# Patient Record
Sex: MALE | Race: WHITE | URBAN - METROPOLITAN AREA
[De-identification: names, ages, dates, MRNs, and addresses within clinical notes are randomized per-mention and may not be internally consistent; named-entity substitution may affect disease eponyms.]

---

## 2017-01-16 ENCOUNTER — OUTPATIENT (OUTPATIENT)
Dept: OUTPATIENT SERVICES | Facility: HOSPITAL | Age: 57
LOS: 1 days | Discharge: HOME | End: 2017-01-16

## 2017-06-27 DIAGNOSIS — K40.90 UNILATERAL INGUINAL HERNIA, WITHOUT OBSTRUCTION OR GANGRENE, NOT SPECIFIED AS RECURRENT: ICD-10-CM

## 2017-06-27 DIAGNOSIS — M32.9 SYSTEMIC LUPUS ERYTHEMATOSUS, UNSPECIFIED: ICD-10-CM

## 2017-06-27 DIAGNOSIS — J44.9 CHRONIC OBSTRUCTIVE PULMONARY DISEASE, UNSPECIFIED: ICD-10-CM

## 2017-06-27 DIAGNOSIS — F43.10 POST-TRAUMATIC STRESS DISORDER, UNSPECIFIED: ICD-10-CM

## 2018-01-25 ENCOUNTER — APPOINTMENT (OUTPATIENT)
Dept: SURGERY | Facility: CLINIC | Age: 58
End: 2018-01-25
Payer: MEDICARE

## 2018-01-25 VITALS — BODY MASS INDEX: 28.23 KG/M2 | HEIGHT: 74 IN | WEIGHT: 220 LBS

## 2018-01-25 DIAGNOSIS — E66.09 OTHER OBESITY DUE TO EXCESS CALORIES: ICD-10-CM

## 2018-01-25 DIAGNOSIS — M54.9 DORSALGIA, UNSPECIFIED: ICD-10-CM

## 2018-01-25 DIAGNOSIS — S76.212A STRAIN OF ADDUCTOR MUSCLE, FASCIA AND TENDON OF LEFT THIGH, INITIAL ENCOUNTER: ICD-10-CM

## 2018-01-25 PROCEDURE — 99214 OFFICE O/P EST MOD 30 MIN: CPT

## 2018-04-18 ENCOUNTER — OUTPATIENT (OUTPATIENT)
Dept: OUTPATIENT SERVICES | Facility: HOSPITAL | Age: 58
LOS: 1 days | Discharge: HOME | End: 2018-04-18

## 2018-04-18 DIAGNOSIS — M54.89 OTHER DORSALGIA: ICD-10-CM

## 2020-03-09 ENCOUNTER — TRANSCRIPTION ENCOUNTER (OUTPATIENT)
Age: 60
End: 2020-03-09

## 2020-03-09 VITALS
OXYGEN SATURATION: 99 % | DIASTOLIC BLOOD PRESSURE: 89 MMHG | HEART RATE: 104 BPM | HEIGHT: 74 IN | RESPIRATION RATE: 18 BRPM | SYSTOLIC BLOOD PRESSURE: 148 MMHG | TEMPERATURE: 98 F | WEIGHT: 259.48 LBS

## 2020-03-09 RX ORDER — POVIDONE-IODINE 5 %
1 AEROSOL (ML) TOPICAL ONCE
Refills: 0 | Status: COMPLETED | OUTPATIENT
Start: 2020-03-10 | End: 2020-03-10

## 2020-03-09 NOTE — H&P ADULT - NSICDXPASTSURGICALHX_GEN_ALL_CORE_FT
PAST SURGICAL HISTORY:  S/P hernia repair inguinal    Surgery, elective spinal fusion, T9-L5    Surgery, elective cervical spine fusion    Surgery, elective sppinal cord stimulator implanted    Surgery, elective ankle surgery PAST SURGICAL HISTORY:  S/P hernia repair inguinal    Surgery, elective spinal fusion, T9-L5    Surgery, elective cervical spine fusion    Surgery, elective sppinal cord stimulator implanted    Surgery, elective ankle surgery, right

## 2020-03-09 NOTE — H&P ADULT - NSHPPHYSICALEXAM_GEN_ALL_CORE
General: Alert and oriented, NAD  MSK:  Decreased ROM of lumbar spine secondary to pain.    Remainder of PE as per medical clearance General: Alert and oriented, NAD  MSK:  Decreased ROM of lumbar spine secondary to pain.  calves soft, nontender bilaterally   sensation intact to light touch bilateral lower extremities  DP 2+,  brisk capillary refill  EHL/FHL/TA/GS 5/5 bilaterally     Remainder of PE as per medical clearance

## 2020-03-09 NOTE — H&P ADULT - PROBLEM SELECTOR PLAN 1
Admit to orthopedics.  Presents for elective anterior and posterior spinal fusion with instrumentation and cage and iliac crest bone graft.   Medically optimized and cleared for surgery by  Admit to orthopedics.  Presents for elective anterior and posterior spinal fusion with instrumentation and cage and iliac crest bone graft.   Medically optimized and cleared for surgery

## 2020-03-09 NOTE — H&P ADULT - NSICDXPASTMEDICALHX_GEN_ALL_CORE_FT
PAST MEDICAL HISTORY:  Chronic lower back pain     Chronic obstructive pulmonary disease COPD (chronic obstructive pulmonary disease)    Depression anxiety, PTSD, post 911    GERD (gastroesophageal reflux disease)     HLD (hyperlipidemia)     HTN (hypertension)     Spinal stenosis     Systemic lupus erythematosus Lupus (systemic lupus erythematosus)

## 2020-03-09 NOTE — H&P ADULT - HISTORY OF PRESENT ILLNESS
60yo male co lumbar spine pain x .   Patient presents for elective anterior and posterior spinal fusion with instrumentation and cage and iliac crest bone graft. 58yo male co lumbar spine pain x since March 2010. Patient denies known injury, Reports neck and back pain have gradually progressed over time. Patient describes pain radiating into his lower extremities. Denies use of a cane or walker. Denies significant numbness/tingling, weakness. He reports failure of conservative management including  Patient presents for elective anterior and posterior spinal fusion with instrumentation and cage and iliac crest bone graft. 58yo male co lumbar spine pain x since March 2010. Patient denies known injury, Reports neck and back pain have gradually progressed over time. Patient describes pain radiating into his lower extremities. Denies use of a cane or walker. Denies significant numbness/tingling, weakness. He reports failure of conservative management including activity modification, oral analgesics, previous procedures. He denies recent illness. Endorses steroid use of lupus. Denies h/o blood clots or use of anticoagulants.   Patient presents for elective anterior and posterior spinal fusion with instrumentation and cage, possible iliac crest bone graft, possible pelvic/SI joint fusion with Dr. Ratliff

## 2020-03-09 NOTE — H&P ADULT - NSHPLABSRESULTS_GEN_ALL_CORE
Preop CBC, BMP, PT/INR, PTT:   UA negative   3M DOS   T + S DOS   Preop CXR within normal limits, reviewed per medical clearance   Preop EKG NSR and reviewed per medical clearance Preop CBC, BMP, PT/INR, PTT  UA negative   3M DOS   T + S DOS   Preop CXR   Preop EKG

## 2020-03-10 ENCOUNTER — INPATIENT (INPATIENT)
Facility: HOSPITAL | Age: 60
LOS: 8 days | Discharge: HOME | DRG: 455 | End: 2020-03-19
Attending: SPECIALIST | Admitting: SPECIALIST
Payer: MEDICARE

## 2020-03-10 DIAGNOSIS — F32.9 MAJOR DEPRESSIVE DISORDER, SINGLE EPISODE, UNSPECIFIED: ICD-10-CM

## 2020-03-10 DIAGNOSIS — I10 ESSENTIAL (PRIMARY) HYPERTENSION: ICD-10-CM

## 2020-03-10 DIAGNOSIS — Z41.9 ENCOUNTER FOR PROCEDURE FOR PURPOSES OTHER THAN REMEDYING HEALTH STATE, UNSPECIFIED: Chronic | ICD-10-CM

## 2020-03-10 DIAGNOSIS — Z98.890 OTHER SPECIFIED POSTPROCEDURAL STATES: Chronic | ICD-10-CM

## 2020-03-10 DIAGNOSIS — J44.9 CHRONIC OBSTRUCTIVE PULMONARY DISEASE, UNSPECIFIED: ICD-10-CM

## 2020-03-10 DIAGNOSIS — E78.5 HYPERLIPIDEMIA, UNSPECIFIED: ICD-10-CM

## 2020-03-10 DIAGNOSIS — M32.9 SYSTEMIC LUPUS ERYTHEMATOSUS, UNSPECIFIED: ICD-10-CM

## 2020-03-10 DIAGNOSIS — K21.9 GASTRO-ESOPHAGEAL REFLUX DISEASE WITHOUT ESOPHAGITIS: ICD-10-CM

## 2020-03-10 DIAGNOSIS — M48.00 SPINAL STENOSIS, SITE UNSPECIFIED: ICD-10-CM

## 2020-03-10 LAB
BASE EXCESS BLDA CALC-SCNC: -3 MMOL/L — LOW (ref -2–3)
BASE EXCESS BLDA CALC-SCNC: 0.1 MMOL/L — SIGNIFICANT CHANGE UP (ref -2–3)
CA-I BLDA-SCNC: 1.12 MMOL/L — SIGNIFICANT CHANGE UP (ref 1.12–1.3)
CA-I BLDA-SCNC: 1.12 MMOL/L — SIGNIFICANT CHANGE UP (ref 1.12–1.3)
COHGB MFR BLDA: 0.2 % — SIGNIFICANT CHANGE UP
COHGB MFR BLDA: 0.5 % — SIGNIFICANT CHANGE UP
GLUCOSE BLDC GLUCOMTR-MCNC: 117 MG/DL — HIGH (ref 70–99)
GLUCOSE BLDC GLUCOMTR-MCNC: 138 MG/DL — HIGH (ref 70–99)
HCO3 BLDA-SCNC: 25 MMOL/L — SIGNIFICANT CHANGE UP (ref 21–28)
HCO3 BLDA-SCNC: 25 MMOL/L — SIGNIFICANT CHANGE UP (ref 21–28)
HCT VFR BLD CALC: 36.2 % — LOW (ref 39–50)
HGB BLD-MCNC: 12 G/DL — LOW (ref 13–17)
HGB BLDA-MCNC: 12.3 G/DL — LOW (ref 13–17)
HGB BLDA-MCNC: 12.6 G/DL — LOW (ref 13–17)
MCHC RBC-ENTMCNC: 32 PG — SIGNIFICANT CHANGE UP (ref 27–34)
MCHC RBC-ENTMCNC: 33.1 GM/DL — SIGNIFICANT CHANGE UP (ref 32–36)
MCV RBC AUTO: 96.5 FL — SIGNIFICANT CHANGE UP (ref 80–100)
METHGB MFR BLDA: 0.2 % — SIGNIFICANT CHANGE UP
METHGB MFR BLDA: 0.5 % — SIGNIFICANT CHANGE UP
NRBC # BLD: 0 /100 WBCS — SIGNIFICANT CHANGE UP (ref 0–0)
O2 CT VFR BLDA CALC: 15.4 ML/DL — SIGNIFICANT CHANGE UP (ref 15–23)
O2 CT VFR BLDA CALC: 18.3 ML/DL — SIGNIFICANT CHANGE UP (ref 15–23)
OXYHGB MFR BLDA: 89 % — LOW (ref 94–100)
OXYHGB MFR BLDA: 99 % — SIGNIFICANT CHANGE UP (ref 94–100)
PCO2 BLDA: 41 MMHG — SIGNIFICANT CHANGE UP (ref 35–48)
PCO2 BLDA: 56 MMHG — HIGH (ref 35–48)
PH BLDA: 7.26 — LOW (ref 7.35–7.45)
PH BLDA: 7.4 — SIGNIFICANT CHANGE UP (ref 7.35–7.45)
PLATELET # BLD AUTO: 230 K/UL — SIGNIFICANT CHANGE UP (ref 150–400)
PO2 BLDA: 286 MMHG — HIGH (ref 83–108)
PO2 BLDA: 63 MMHG — LOW (ref 83–108)
POTASSIUM BLDA-SCNC: 3.6 MMOL/L — SIGNIFICANT CHANGE UP (ref 3.5–4.9)
POTASSIUM BLDA-SCNC: 4 MMOL/L — SIGNIFICANT CHANGE UP (ref 3.5–4.9)
RBC # BLD: 3.75 M/UL — LOW (ref 4.2–5.8)
RBC # FLD: 13.4 % — SIGNIFICANT CHANGE UP (ref 10.3–14.5)
SAO2 % BLDA: 100 % — SIGNIFICANT CHANGE UP (ref 95–100)
SAO2 % BLDA: 90 % — LOW (ref 95–100)
SODIUM BLDA-SCNC: 139 MMOL/L — SIGNIFICANT CHANGE UP (ref 138–146)
SODIUM BLDA-SCNC: 142 MMOL/L — SIGNIFICANT CHANGE UP (ref 138–146)
WBC # BLD: 14.8 K/UL — HIGH (ref 3.8–10.5)
WBC # FLD AUTO: 14.8 K/UL — HIGH (ref 3.8–10.5)

## 2020-03-10 RX ORDER — OXYCODONE HYDROCHLORIDE 5 MG/1
5 TABLET ORAL EVERY 4 HOURS
Refills: 0 | Status: DISCONTINUED | OUTPATIENT
Start: 2020-03-10 | End: 2020-03-11

## 2020-03-10 RX ORDER — BUPIVACAINE 13.3 MG/ML
20 INJECTION, SUSPENSION, LIPOSOMAL INFILTRATION ONCE
Refills: 0 | Status: DISCONTINUED | OUTPATIENT
Start: 2020-03-10 | End: 2020-03-19

## 2020-03-10 RX ORDER — OXYCODONE HYDROCHLORIDE 5 MG/1
10 TABLET ORAL EVERY 4 HOURS
Refills: 0 | Status: DISCONTINUED | OUTPATIENT
Start: 2020-03-10 | End: 2020-03-11

## 2020-03-10 RX ORDER — BUPIVACAINE HCL/PF 7.5 MG/ML
400 VIAL (ML) INJECTION
Refills: 0 | Status: DISCONTINUED | OUTPATIENT
Start: 2020-03-10 | End: 2020-03-18

## 2020-03-10 RX ORDER — CHLORHEXIDINE GLUCONATE 213 G/1000ML
1 SOLUTION TOPICAL ONCE
Refills: 0 | Status: COMPLETED | OUTPATIENT
Start: 2020-03-10 | End: 2020-03-10

## 2020-03-10 RX ORDER — BUDESONIDE AND FORMOTEROL FUMARATE DIHYDRATE 160; 4.5 UG/1; UG/1
2 AEROSOL RESPIRATORY (INHALATION)
Refills: 0 | Status: DISCONTINUED | OUTPATIENT
Start: 2020-03-10 | End: 2020-03-19

## 2020-03-10 RX ORDER — ATORVASTATIN CALCIUM 80 MG/1
10 TABLET, FILM COATED ORAL AT BEDTIME
Refills: 0 | Status: DISCONTINUED | OUTPATIENT
Start: 2020-03-10 | End: 2020-03-19

## 2020-03-10 RX ORDER — ACETAMINOPHEN 500 MG
650 TABLET ORAL EVERY 6 HOURS
Refills: 0 | Status: DISCONTINUED | OUTPATIENT
Start: 2020-03-10 | End: 2020-03-19

## 2020-03-10 RX ORDER — PANTOPRAZOLE SODIUM 20 MG/1
40 TABLET, DELAYED RELEASE ORAL
Refills: 0 | Status: DISCONTINUED | OUTPATIENT
Start: 2020-03-10 | End: 2020-03-19

## 2020-03-10 RX ORDER — HYDROMORPHONE HYDROCHLORIDE 2 MG/ML
0.5 INJECTION INTRAMUSCULAR; INTRAVENOUS; SUBCUTANEOUS EVERY 4 HOURS
Refills: 0 | Status: DISCONTINUED | OUTPATIENT
Start: 2020-03-10 | End: 2020-03-15

## 2020-03-10 RX ORDER — METHYLPHENIDATE HCL 5 MG
18 TABLET ORAL DAILY
Refills: 0 | Status: DISCONTINUED | OUTPATIENT
Start: 2020-03-10 | End: 2020-03-10

## 2020-03-10 RX ORDER — HYDROMORPHONE HYDROCHLORIDE 2 MG/ML
0.5 INJECTION INTRAMUSCULAR; INTRAVENOUS; SUBCUTANEOUS
Refills: 0 | Status: DISCONTINUED | OUTPATIENT
Start: 2020-03-10 | End: 2020-03-10

## 2020-03-10 RX ORDER — BUPROPION HYDROCHLORIDE 150 MG/1
150 TABLET, EXTENDED RELEASE ORAL DAILY
Refills: 0 | Status: DISCONTINUED | OUTPATIENT
Start: 2020-03-10 | End: 2020-03-19

## 2020-03-10 RX ORDER — VENLAFAXINE HCL 75 MG
225 CAPSULE, EXT RELEASE 24 HR ORAL DAILY
Refills: 0 | Status: DISCONTINUED | OUTPATIENT
Start: 2020-03-10 | End: 2020-03-19

## 2020-03-10 RX ORDER — MAGNESIUM HYDROXIDE 400 MG/1
30 TABLET, CHEWABLE ORAL EVERY 12 HOURS
Refills: 0 | Status: DISCONTINUED | OUTPATIENT
Start: 2020-03-10 | End: 2020-03-19

## 2020-03-10 RX ORDER — CYCLOBENZAPRINE HYDROCHLORIDE 10 MG/1
10 TABLET, FILM COATED ORAL EVERY 8 HOURS
Refills: 0 | Status: DISCONTINUED | OUTPATIENT
Start: 2020-03-10 | End: 2020-03-19

## 2020-03-10 RX ORDER — SODIUM CHLORIDE 9 MG/ML
1000 INJECTION, SOLUTION INTRAVENOUS
Refills: 0 | Status: DISCONTINUED | OUTPATIENT
Start: 2020-03-10 | End: 2020-03-19

## 2020-03-10 RX ORDER — GABAPENTIN 400 MG/1
600 CAPSULE ORAL THREE TIMES A DAY
Refills: 0 | Status: DISCONTINUED | OUTPATIENT
Start: 2020-03-10 | End: 2020-03-19

## 2020-03-10 RX ORDER — SENNA PLUS 8.6 MG/1
2 TABLET ORAL AT BEDTIME
Refills: 0 | Status: DISCONTINUED | OUTPATIENT
Start: 2020-03-10 | End: 2020-03-19

## 2020-03-10 RX ORDER — TERAZOSIN HYDROCHLORIDE 10 MG/1
3 CAPSULE ORAL
Qty: 0 | Refills: 0 | DISCHARGE

## 2020-03-10 RX ORDER — DOXAZOSIN MESYLATE 4 MG
3 TABLET ORAL AT BEDTIME
Refills: 0 | Status: DISCONTINUED | OUTPATIENT
Start: 2020-03-10 | End: 2020-03-19

## 2020-03-10 RX ORDER — ZOLPIDEM TARTRATE 10 MG/1
5 TABLET ORAL AT BEDTIME
Refills: 0 | Status: DISCONTINUED | OUTPATIENT
Start: 2020-03-10 | End: 2020-03-10

## 2020-03-10 RX ORDER — METHYLPHENIDATE HCL 5 MG
10 TABLET ORAL
Refills: 0 | Status: DISCONTINUED | OUTPATIENT
Start: 2020-03-10 | End: 2020-03-17

## 2020-03-10 RX ORDER — BUPROPION HYDROCHLORIDE 150 MG/1
0 TABLET, EXTENDED RELEASE ORAL
Qty: 0 | Refills: 0 | DISCHARGE

## 2020-03-10 RX ORDER — CEFAZOLIN SODIUM 1 G
2000 VIAL (EA) INJECTION EVERY 8 HOURS
Refills: 0 | Status: COMPLETED | OUTPATIENT
Start: 2020-03-10 | End: 2020-03-11

## 2020-03-10 RX ADMIN — SENNA PLUS 2 TABLET(S): 8.6 TABLET ORAL at 23:53

## 2020-03-10 RX ADMIN — Medication 1 APPLICATION(S): at 06:54

## 2020-03-10 RX ADMIN — Medication 3 MILLIGRAM(S): at 23:55

## 2020-03-10 RX ADMIN — Medication 100 MILLIGRAM(S): at 20:16

## 2020-03-10 RX ADMIN — BUDESONIDE AND FORMOTEROL FUMARATE DIHYDRATE 2 PUFF(S): 160; 4.5 AEROSOL RESPIRATORY (INHALATION) at 20:17

## 2020-03-10 RX ADMIN — GABAPENTIN 600 MILLIGRAM(S): 400 CAPSULE ORAL at 23:53

## 2020-03-10 RX ADMIN — SODIUM CHLORIDE 145 MILLILITER(S): 9 INJECTION, SOLUTION INTRAVENOUS at 23:52

## 2020-03-10 RX ADMIN — HYDROMORPHONE HYDROCHLORIDE 0.5 MILLIGRAM(S): 2 INJECTION INTRAMUSCULAR; INTRAVENOUS; SUBCUTANEOUS at 19:50

## 2020-03-10 RX ADMIN — HYDROMORPHONE HYDROCHLORIDE 0.5 MILLIGRAM(S): 2 INJECTION INTRAMUSCULAR; INTRAVENOUS; SUBCUTANEOUS at 20:05

## 2020-03-10 RX ADMIN — ATORVASTATIN CALCIUM 10 MILLIGRAM(S): 80 TABLET, FILM COATED ORAL at 23:53

## 2020-03-10 RX ADMIN — CHLORHEXIDINE GLUCONATE 1 APPLICATION(S): 213 SOLUTION TOPICAL at 06:54

## 2020-03-10 NOTE — BRIEF OPERATIVE NOTE - NSICDXBRIEFPROCEDURE_GEN_ALL_CORE_FT
PROCEDURES:  Fusion, spine, lumbar, simple, anterior approach 10-Mar-2020 10:58:41  Lexy Mcclendon
PROCEDURES:  Fusion, spine, lumbar, posterior approach 10-Mar-2020 16:59:31  Yanique Simms  Fusion, spine, lumbar, simple, anterior approach 10-Mar-2020 10:58:41  Lexy Mcclendon

## 2020-03-10 NOTE — BRIEF OPERATIVE NOTE - NSICDXBRIEFPREOP_GEN_ALL_CORE_FT
PRE-OP DIAGNOSIS:  DDD (degenerative disc disease), lumbar 10-Mar-2020 10:59:23  Lexy Mcclendon
PRE-OP DIAGNOSIS:  DDD (degenerative disc disease), lumbar 10-Mar-2020 10:59:23  Lexy Mcclendon

## 2020-03-10 NOTE — BRIEF OPERATIVE NOTE - OPERATION/FINDINGS
- L5-S1 fusion via anterior, transabdominal, spinal exposure.   - left iliac artery and vein identified and preserved, retracted throughout the entire case.   - retroperitoneum closed with 3-0 running vicryl.   - fascia closed with running 0 PDS. skin closed with interrupted 3-0 vicryl and running quilled monofilament.   - palpable pulses at conclusion of the case.
Spinal stenosis. See full operative report for details.

## 2020-03-11 LAB
ANION GAP SERPL CALC-SCNC: 11 MMOL/L — SIGNIFICANT CHANGE UP (ref 5–17)
BASOPHILS # BLD AUTO: 0.01 K/UL — SIGNIFICANT CHANGE UP (ref 0–0.2)
BASOPHILS NFR BLD AUTO: 0.1 % — SIGNIFICANT CHANGE UP (ref 0–2)
BUN SERPL-MCNC: 11 MG/DL — SIGNIFICANT CHANGE UP (ref 7–23)
CALCIUM SERPL-MCNC: 8.4 MG/DL — SIGNIFICANT CHANGE UP (ref 8.4–10.5)
CHLORIDE SERPL-SCNC: 101 MMOL/L — SIGNIFICANT CHANGE UP (ref 96–108)
CO2 SERPL-SCNC: 27 MMOL/L — SIGNIFICANT CHANGE UP (ref 22–31)
CREAT SERPL-MCNC: 0.76 MG/DL — SIGNIFICANT CHANGE UP (ref 0.5–1.3)
EOSINOPHIL # BLD AUTO: 0 K/UL — SIGNIFICANT CHANGE UP (ref 0–0.5)
EOSINOPHIL NFR BLD AUTO: 0 % — SIGNIFICANT CHANGE UP (ref 0–6)
GLUCOSE SERPL-MCNC: 137 MG/DL — HIGH (ref 70–99)
HCT VFR BLD CALC: 34.2 % — LOW (ref 39–50)
HGB BLD-MCNC: 11.5 G/DL — LOW (ref 13–17)
IMM GRANULOCYTES NFR BLD AUTO: 0.7 % — SIGNIFICANT CHANGE UP (ref 0–1.5)
LYMPHOCYTES # BLD AUTO: 0.88 K/UL — LOW (ref 1–3.3)
LYMPHOCYTES # BLD AUTO: 5.9 % — LOW (ref 13–44)
MCHC RBC-ENTMCNC: 32.2 PG — SIGNIFICANT CHANGE UP (ref 27–34)
MCHC RBC-ENTMCNC: 33.6 GM/DL — SIGNIFICANT CHANGE UP (ref 32–36)
MCV RBC AUTO: 95.8 FL — SIGNIFICANT CHANGE UP (ref 80–100)
MONOCYTES # BLD AUTO: 1.34 K/UL — HIGH (ref 0–0.9)
MONOCYTES NFR BLD AUTO: 9 % — SIGNIFICANT CHANGE UP (ref 2–14)
NEUTROPHILS # BLD AUTO: 12.61 K/UL — HIGH (ref 1.8–7.4)
NEUTROPHILS NFR BLD AUTO: 84.3 % — HIGH (ref 43–77)
NRBC # BLD: 0 /100 WBCS — SIGNIFICANT CHANGE UP (ref 0–0)
PLATELET # BLD AUTO: 214 K/UL — SIGNIFICANT CHANGE UP (ref 150–400)
POTASSIUM SERPL-MCNC: 3.6 MMOL/L — SIGNIFICANT CHANGE UP (ref 3.5–5.3)
POTASSIUM SERPL-SCNC: 3.6 MMOL/L — SIGNIFICANT CHANGE UP (ref 3.5–5.3)
RBC # BLD: 3.57 M/UL — LOW (ref 4.2–5.8)
RBC # FLD: 13.2 % — SIGNIFICANT CHANGE UP (ref 10.3–14.5)
SODIUM SERPL-SCNC: 139 MMOL/L — SIGNIFICANT CHANGE UP (ref 135–145)
WBC # BLD: 14.94 K/UL — HIGH (ref 3.8–10.5)
WBC # FLD AUTO: 14.94 K/UL — HIGH (ref 3.8–10.5)

## 2020-03-11 PROCEDURE — 72100 X-RAY EXAM L-S SPINE 2/3 VWS: CPT | Mod: 26

## 2020-03-11 RX ORDER — TRAMADOL HYDROCHLORIDE 50 MG/1
50 TABLET ORAL EVERY 4 HOURS
Refills: 0 | Status: DISCONTINUED | OUTPATIENT
Start: 2020-03-11 | End: 2020-03-16

## 2020-03-11 RX ORDER — TRAMADOL HYDROCHLORIDE 50 MG/1
25 TABLET ORAL EVERY 4 HOURS
Refills: 0 | Status: DISCONTINUED | OUTPATIENT
Start: 2020-03-11 | End: 2020-03-16

## 2020-03-11 RX ADMIN — Medication 10 MILLIGRAM(S): at 06:57

## 2020-03-11 RX ADMIN — GABAPENTIN 600 MILLIGRAM(S): 400 CAPSULE ORAL at 15:09

## 2020-03-11 RX ADMIN — TRAMADOL HYDROCHLORIDE 50 MILLIGRAM(S): 50 TABLET ORAL at 01:07

## 2020-03-11 RX ADMIN — Medication 1 TABLET(S): at 11:59

## 2020-03-11 RX ADMIN — HYDROMORPHONE HYDROCHLORIDE 0.5 MILLIGRAM(S): 2 INJECTION INTRAMUSCULAR; INTRAVENOUS; SUBCUTANEOUS at 02:54

## 2020-03-11 RX ADMIN — GABAPENTIN 600 MILLIGRAM(S): 400 CAPSULE ORAL at 06:57

## 2020-03-11 RX ADMIN — SENNA PLUS 2 TABLET(S): 8.6 TABLET ORAL at 21:56

## 2020-03-11 RX ADMIN — Medication 3 MILLIGRAM(S): at 21:57

## 2020-03-11 RX ADMIN — ATORVASTATIN CALCIUM 10 MILLIGRAM(S): 80 TABLET, FILM COATED ORAL at 21:56

## 2020-03-11 RX ADMIN — HYDROMORPHONE HYDROCHLORIDE 0.5 MILLIGRAM(S): 2 INJECTION INTRAMUSCULAR; INTRAVENOUS; SUBCUTANEOUS at 23:08

## 2020-03-11 RX ADMIN — TRAMADOL HYDROCHLORIDE 50 MILLIGRAM(S): 50 TABLET ORAL at 08:30

## 2020-03-11 RX ADMIN — TRAMADOL HYDROCHLORIDE 50 MILLIGRAM(S): 50 TABLET ORAL at 12:45

## 2020-03-11 RX ADMIN — BUPROPION HYDROCHLORIDE 150 MILLIGRAM(S): 150 TABLET, EXTENDED RELEASE ORAL at 11:57

## 2020-03-11 RX ADMIN — Medication 10 MILLIGRAM(S): at 17:06

## 2020-03-11 RX ADMIN — TRAMADOL HYDROCHLORIDE 50 MILLIGRAM(S): 50 TABLET ORAL at 21:56

## 2020-03-11 RX ADMIN — BUDESONIDE AND FORMOTEROL FUMARATE DIHYDRATE 2 PUFF(S): 160; 4.5 AEROSOL RESPIRATORY (INHALATION) at 06:56

## 2020-03-11 RX ADMIN — TRAMADOL HYDROCHLORIDE 50 MILLIGRAM(S): 50 TABLET ORAL at 18:00

## 2020-03-11 RX ADMIN — Medication 20 MILLIGRAM(S): at 07:31

## 2020-03-11 RX ADMIN — BUDESONIDE AND FORMOTEROL FUMARATE DIHYDRATE 2 PUFF(S): 160; 4.5 AEROSOL RESPIRATORY (INHALATION) at 17:06

## 2020-03-11 RX ADMIN — Medication 225 MILLIGRAM(S): at 12:02

## 2020-03-11 RX ADMIN — TRAMADOL HYDROCHLORIDE 50 MILLIGRAM(S): 50 TABLET ORAL at 17:08

## 2020-03-11 RX ADMIN — TRAMADOL HYDROCHLORIDE 50 MILLIGRAM(S): 50 TABLET ORAL at 11:59

## 2020-03-11 RX ADMIN — TRAMADOL HYDROCHLORIDE 50 MILLIGRAM(S): 50 TABLET ORAL at 07:31

## 2020-03-11 RX ADMIN — Medication 100 MILLIGRAM(S): at 04:24

## 2020-03-11 RX ADMIN — TRAMADOL HYDROCHLORIDE 50 MILLIGRAM(S): 50 TABLET ORAL at 02:07

## 2020-03-11 RX ADMIN — HYDROMORPHONE HYDROCHLORIDE 0.5 MILLIGRAM(S): 2 INJECTION INTRAMUSCULAR; INTRAVENOUS; SUBCUTANEOUS at 04:00

## 2020-03-11 RX ADMIN — GABAPENTIN 600 MILLIGRAM(S): 400 CAPSULE ORAL at 21:57

## 2020-03-11 RX ADMIN — HYDROMORPHONE HYDROCHLORIDE 0.5 MILLIGRAM(S): 2 INJECTION INTRAMUSCULAR; INTRAVENOUS; SUBCUTANEOUS at 23:38

## 2020-03-11 RX ADMIN — PANTOPRAZOLE SODIUM 40 MILLIGRAM(S): 20 TABLET, DELAYED RELEASE ORAL at 06:57

## 2020-03-11 NOTE — PROGRESS NOTE ADULT - ASSESSMENT
This is a 58 y/o M s/p L5-S1 fusion through anterior approach   Gen surg consulted intra op for anterior approach   Currently doing well, abdomen soft, palpable DP pulses bilaterally   Passing gas, no N/V, no BM     Plan:  - Can advance to clears   - Management per primary team   - surgery team4c will continue to follow

## 2020-03-11 NOTE — PROGRESS NOTE ADULT - SUBJECTIVE AND OBJECTIVE BOX
Ortho Post Op Check    Procedure: ALIF L5-S1 and revision PSF L5-S1  Surgeon: Gaudencio     Pt comfortable without complaints, pain controlled  Denies CP, SOB, N/V, numbness/tingling     Vital Signs Last 24 Hrs  T(C): 36.3 (03-10-20 @ 21:27), Max: 37.2 (03-10-20 @ 20:28)  T(F): 97.3 (03-10-20 @ 21:27), Max: 99 (03-10-20 @ 20:28)  HR: 92 (03-10-20 @ 21:27) (85 - 92)  BP: 152/71 (03-10-20 @ 21:27) (138/64 - 152/71)  BP(mean): 108 (03-10-20 @ 20:28) (91 - 109)  RR: 18 (03-10-20 @ 20:28) (15 - 18)  SpO2: 96% (03-10-20 @ 21:27) (94% - 97%)  AVSS    General: Pt Alert and oriented, NAD  Abd soft, NT/ND  Abdominal DSG C/D/I  Back DSG C/D/I  HV x2 in place   Pulses: 2+ DP/PT  Sensation intact BL LE  Motor strength 5/5 and symmetric   Toes WWP                          12.0   14.80 )-----------( 230      ( 10 Mar 2020 17:43 )             36.2       Drain output:    03-10-20 @ 07:01  -  03-11-20 @ 00:07  --------------------------------------------------------  OUT:    Accordian: 115 mL    Accordian: 125 mL  Total OUT: 240 mL    Post-op X-Ray: Intraop fluoro     A/P: 59yMale POD#0 s/p ALIF L5-S1 and revision PSF L5-S1  - Stable  - Pain Control  - DVT ppx: SCDs  - Post op abx: ancef periop  - PT, WBS: WBAT  - monitor drains  - NPO until bowel function   - dispo pending     Ortho Pager [phone]

## 2020-03-11 NOTE — PROGRESS NOTE ADULT - SUBJECTIVE AND OBJECTIVE BOX
SUBJECTIVE:   Patient says feeling well this morning   Passing gas, no BM, no N/V    MEDICATIONS  (STANDING):  atorvastatin 10 milliGRAM(s) Oral at bedtime  budesonide 160 MICROgram(s)/formoterol 4.5 MICROgram(s) Inhaler 2 Puff(s) Inhalation two times a day  BUpivacaine 0.25% On-Q Pump 400 milliLiter(s) (4 mL/Hr) IntraDermal. <Continuous>  BUpivacaine liposome 1.3% Injectable (no eMAR) 20 milliLiter(s) Local Injection once  buPROPion XL . 150 milliGRAM(s) Oral daily  doxazosin 3 milliGRAM(s) Oral at bedtime  gabapentin 600 milliGRAM(s) Oral three times a day  lactated ringers. 1000 milliLiter(s) (145 mL/Hr) IV Continuous <Continuous>  methylphenidate 10 milliGRAM(s) Oral two times a day  multivitamin 1 Tablet(s) Oral daily  pantoprazole    Tablet 40 milliGRAM(s) Oral before breakfast  predniSONE   Tablet 20 milliGRAM(s) Oral daily  senna 2 Tablet(s) Oral at bedtime  venlafaxine XR. 225 milliGRAM(s) Oral daily    MEDICATIONS  (PRN):  acetaminophen   Tablet .. 650 milliGRAM(s) Oral every 6 hours PRN Temp greater or equal to 38C (100.4F), Mild Pain (1 - 3)  cyclobenzaprine 10 milliGRAM(s) Oral every 8 hours PRN Muscle Spasm  HYDROmorphone  Injectable 0.5 milliGRAM(s) IV Push every 4 hours PRN Breakthrough pain  HYDROmorphone  Injectable 0.5 milliGRAM(s) IV Push every 15 minutes PRN Breakthrough Pain  magnesium hydroxide Suspension 30 milliLiter(s) Oral every 12 hours PRN Constipation  traMADol 25 milliGRAM(s) Oral every 4 hours PRN Moderate Pain (4 - 6)  traMADol 50 milliGRAM(s) Oral every 4 hours PRN Severe Pain (7 - 10)  zolpidem 5 milliGRAM(s) Oral at bedtime PRN Insomnia      Vital Signs Last 24 Hrs  T(C): 36.5 (11 Mar 2020 08:52), Max: 37.2 (10 Mar 2020 20:28)  T(F): 97.7 (11 Mar 2020 08:52), Max: 99 (10 Mar 2020 20:28)  HR: 124 (11 Mar 2020 09:40) (79 - 124)  BP: 127/68 (11 Mar 2020 08:52) (127/60 - 152/71)  BP(mean): 108 (10 Mar 2020 20:28) (87 - 109)  RR: 18 (11 Mar 2020 08:52) (15 - 20)  SpO2: 94% (11 Mar 2020 08:52) (93% - 97%)  I&O's Detail    10 Mar 2020 07:01  -  11 Mar 2020 07:00  --------------------------------------------------------  IN:    lactated ringers.: 2030 mL  Total IN: 2030 mL    OUT:    Accordian: 415 mL    Accordian: 215 mL    Indwelling Catheter - Urethral: 150 mL    Intermittent Catheterization - Urethral: 500 mL  Total OUT: 1280 mL    Total NET: 750 mL      11 Mar 2020 07:01  -  11 Mar 2020 13:28  --------------------------------------------------------  IN:  Total IN: 0 mL    OUT:    Accordian: 150 mL  Total OUT: 150 mL    Total NET: -150 mL    PHYSICAL EXAM:   General: NAD, resting comfortably in bed  HEENT: normocephalic, no scleral ictera   Neuro: alert and oriented, no focal deficit   C/V: regular rate and rhythm, no murmur to auscultation, no carotid bruit. Palpable DP pulses b/l    Pulm: Nonlabored breathing, lungs are clear to auscultation   Abd: abdomen soft, minimaly tender to palpation, no hepatomegaly   MSK: no swelling, no deformity  Skin: dresssing in place, no saturated, no surrounding erythema   Psych: appropriate mood and affect, cooperative         LABS:                        11.5   14.94 )-----------( 214      ( 11 Mar 2020 06:44 )             34.2     03-11    139  |  101  |  11  ----------------------------<  137<H>  3.6   |  27  |  0.76    Ca    8.4      11 Mar 2020 06:44            RADIOLOGY & ADDITIONAL STUDIES:

## 2020-03-11 NOTE — PHYSICAL THERAPY INITIAL EVALUATION ADULT - PERTINENT HX OF CURRENT PROBLEM, REHAB EVAL
58yo male co lumbar spine pain x since March 2010. Patient denies known injury, Reports neck and back pain have gradually progressed over time. Patient describes pain radiating into his lower extremities.

## 2020-03-11 NOTE — PHYSICAL THERAPY INITIAL EVALUATION ADULT - PLANNED THERAPY INTERVENTIONS, PT EVAL
gait training/neuromuscular re-education/postural re-education/strengthening/transfer training/bed mobility training/balance training/manual therapy techniques/stretching

## 2020-03-11 NOTE — PHYSICAL THERAPY INITIAL EVALUATION ADULT - ADDITIONAL COMMENTS
Pt lives in a private home, with his wife, +stairs, independent with all functional mobility without assistive device prior to surgery. Pt reports he had spinal surgery in september 2014 here which was complicated and had 2 surgeries, was in the hospital for 15 days without PT and then [facility].  Pt reports it took him a few months to get back to walking regularly.  Since then pt has been doing very well.  Of note pt reports dx of Lupus as a result of being a  in 9/11 and pt reports he is normally sinus tachy.

## 2020-03-11 NOTE — PROGRESS NOTE ADULT - SUBJECTIVE AND OBJECTIVE BOX
POST OPERATIVE DAY #: 1  STATUS POST: ALIF/PSF L5-S1                       SUBJECTIVE: Patient seen and examined. Pt. states he is doing well, but c/o soreness in abdomen from surgery.   Denies any sob/cp/n/v/numbness or tingling in b/l les.     OBJECTIVE:     Vital Signs Last 24 Hrs  T(C): 36.7 (11 Mar 2020 14:03), Max: 37.2 (10 Mar 2020 20:28)  T(F): 98.1 (11 Mar 2020 14:03), Max: 99 (10 Mar 2020 20:28)  HR: 95 (11 Mar 2020 14:03) (79 - 124)  BP: 132/66 (11 Mar 2020 14:03) (127/60 - 152/71)  BP(mean): 108 (10 Mar 2020 20:28) (87 - 109)  RR: 20 (11 Mar 2020 14:03) (15 - 20)  SpO2: 95% (11 Mar 2020 14:03) (93% - 97%)    Affected extremity: bilateral lower extremities         Dressing: clean/dry/intact anterior and posterior, with pain pump          Sensation: intact to light touch to patient's baseline         Motor exam: EHL/TA/GS 5/5  Pulses 2+             I&O's Detail    10 Mar 2020 07:01  -  11 Mar 2020 07:00  --------------------------------------------------------  IN:    lactated ringers.: 2030 mL  Total IN: 2030 mL    OUT:    Accordian: 415 mL    Accordian: 215 mL    Indwelling Catheter - Urethral: 150 mL    Intermittent Catheterization - Urethral: 500 mL  Total OUT: 1280 mL    Total NET: 750 mL      11 Mar 2020 07:01  -  11 Mar 2020 14:11  --------------------------------------------------------  IN:  Total IN: 0 mL    OUT:    Accordian: 150 mL  Total OUT: 150 mL    Total NET: -150 mL          LABS:                        11.5   14.94 )-----------( 214      ( 11 Mar 2020 06:44 )             34.2     03-11    139  |  101  |  11  ----------------------------<  137<H>  3.6   |  27  |  0.76    Ca    8.4      11 Mar 2020 06:44            MEDICATIONS:    acetaminophen   Tablet .. 650 milliGRAM(s) Oral every 6 hours PRN  buPROPion XL . 150 milliGRAM(s) Oral daily  cyclobenzaprine 10 milliGRAM(s) Oral every 8 hours PRN  gabapentin 600 milliGRAM(s) Oral three times a day  HYDROmorphone  Injectable 0.5 milliGRAM(s) IV Push every 4 hours PRN  HYDROmorphone  Injectable 0.5 milliGRAM(s) IV Push every 15 minutes PRN  methylphenidate 10 milliGRAM(s) Oral two times a day  traMADol 25 milliGRAM(s) Oral every 4 hours PRN  traMADol 50 milliGRAM(s) Oral every 4 hours PRN  venlafaxine XR. 225 milliGRAM(s) Oral daily  zolpidem 5 milliGRAM(s) Oral at bedtime PRN          ASSESSMENT AND PLAN: 58yo Male s/p ALIF/PSF L5-S1    1. Analgesic pain control  2. DVT prophylaxis:     SCDs     3. Weight Bearing Status:  Weight bearing as tolerated        4. Disposition: Home, pending PT clearance.        5. Post op xrays done.   6. Vikas antonio today- TOV  7. Diet- will advance to clears per gen sx, passing gas.   8. Drains still in.

## 2020-03-11 NOTE — PHYSICAL THERAPY INITIAL EVALUATION ADULT - WEIGHT-BEARING RESTRICTIONS: GAIT, REHAB EVAL
weight-bearing as tolerated
I performed the initial face to face bedside interview with this patient regarding history of present illness, review of symptoms and past medical, social and family history.  I completed an independent physical examination.  I was the initial provider who evaluated this patient.  The history, review of symptoms and examination was documented by the scribe in my presence and I attest to the accuracy of the documentation.  I have signed out the follow up of any pending tests (i.e. labs, radiological studies) to the ACP.  I have discussed the patient’s plan of care and disposition with the ACP

## 2020-03-11 NOTE — PHYSICAL THERAPY INITIAL EVALUATION ADULT - CRITERIA FOR SKILLED THERAPEUTIC INTERVENTIONS
therapy frequency/anticipated discharge recommendation/anticipated equipment needs at discharge/functional limitations in following categories/impairments found/risk reduction/prevention/rehab potential/predicted duration of therapy intervention

## 2020-03-11 NOTE — PHYSICAL THERAPY INITIAL EVALUATION ADULT - GENERAL OBSERVATIONS, REHAB EVAL
Pt received supine with HOB elevated in NAD all lines in tact, call bell in reach, agreeable to PT Evaluation, cleared for PT by KALYN Silva.

## 2020-03-11 NOTE — PROGRESS NOTE ADULT - SUBJECTIVE AND OBJECTIVE BOX
Ortho Progress Note    Procedure: ALIF L5-S1 and revision PSF L5-S1  Surgeon: Gaudencio     Pt comfortable without complaints, pain controlled. Passing flatus  Denies CP, SOB, N/V, numbness/tingling     Vital Signs Last 24 Hrs  T(C): 36.5 (11 Mar 2020 08:52), Max: 37.2 (10 Mar 2020 20:28)  T(F): 97.7 (11 Mar 2020 08:52), Max: 99 (10 Mar 2020 20:28)  HR: 124 (11 Mar 2020 09:40) (79 - 124)  BP: 127/68 (11 Mar 2020 08:52) (127/60 - 152/71)  BP(mean): 108 (10 Mar 2020 20:28) (87 - 109)  RR: 18 (11 Mar 2020 08:52) (15 - 20)  SpO2: 94% (11 Mar 2020 08:52) (93% - 97%)    General: Pt Alert and oriented, NAD  Abd soft, NT/ND  Abdominal DSG C/D/I  Back DSG C/D/I  HV x2 in place   Pulses: 2+ DP/PT  Sensation intact BL LE  Motor strength 5/5 and symmetric   Toes WWP                          11.5   14.94 )-----------( 214      ( 11 Mar 2020 06:44 )             34.2     I&O's Summary    10 Mar 2020 07:01  -  11 Mar 2020 07:00  --------------------------------------------------------  IN: 2030 mL / OUT: 1280 mL / NET: 750 mL    11 Mar 2020 07:01  -  11 Mar 2020 11:16  --------------------------------------------------------  IN: 0 mL / OUT: 150 mL / NET: -150 mL        A/P: 59yMale s/p ALIF L5-S1 and revision PSF L5-S1 3/10/20  - Stable  - Pain Control  - DVT ppx: SCDs  - Post op abx: ancef periop  - PT, WBS: WBAT  - monitor drains   - dispo home pending clearance    Ortho Pager [phone]

## 2020-03-12 ENCOUNTER — TRANSCRIPTION ENCOUNTER (OUTPATIENT)
Age: 60
End: 2020-03-12

## 2020-03-12 LAB
ANION GAP SERPL CALC-SCNC: 12 MMOL/L — SIGNIFICANT CHANGE UP (ref 5–17)
BASOPHILS # BLD AUTO: 0.03 K/UL — SIGNIFICANT CHANGE UP (ref 0–0.2)
BASOPHILS NFR BLD AUTO: 0.3 % — SIGNIFICANT CHANGE UP (ref 0–2)
BUN SERPL-MCNC: 9 MG/DL — SIGNIFICANT CHANGE UP (ref 7–23)
CALCIUM SERPL-MCNC: 8.4 MG/DL — SIGNIFICANT CHANGE UP (ref 8.4–10.5)
CHLORIDE SERPL-SCNC: 103 MMOL/L — SIGNIFICANT CHANGE UP (ref 96–108)
CO2 SERPL-SCNC: 27 MMOL/L — SIGNIFICANT CHANGE UP (ref 22–31)
CREAT SERPL-MCNC: 0.67 MG/DL — SIGNIFICANT CHANGE UP (ref 0.5–1.3)
EOSINOPHIL # BLD AUTO: 0.02 K/UL — SIGNIFICANT CHANGE UP (ref 0–0.5)
EOSINOPHIL NFR BLD AUTO: 0.2 % — SIGNIFICANT CHANGE UP (ref 0–6)
GLUCOSE SERPL-MCNC: 114 MG/DL — HIGH (ref 70–99)
HCT VFR BLD CALC: 30.9 % — LOW (ref 39–50)
HGB BLD-MCNC: 10.3 G/DL — LOW (ref 13–17)
IMM GRANULOCYTES NFR BLD AUTO: 0.4 % — SIGNIFICANT CHANGE UP (ref 0–1.5)
LYMPHOCYTES # BLD AUTO: 1.27 K/UL — SIGNIFICANT CHANGE UP (ref 1–3.3)
LYMPHOCYTES # BLD AUTO: 11 % — LOW (ref 13–44)
MCHC RBC-ENTMCNC: 31.9 PG — SIGNIFICANT CHANGE UP (ref 27–34)
MCHC RBC-ENTMCNC: 33.3 GM/DL — SIGNIFICANT CHANGE UP (ref 32–36)
MCV RBC AUTO: 95.7 FL — SIGNIFICANT CHANGE UP (ref 80–100)
MONOCYTES # BLD AUTO: 1.14 K/UL — HIGH (ref 0–0.9)
MONOCYTES NFR BLD AUTO: 9.8 % — SIGNIFICANT CHANGE UP (ref 2–14)
NEUTROPHILS # BLD AUTO: 9.07 K/UL — HIGH (ref 1.8–7.4)
NEUTROPHILS NFR BLD AUTO: 78.3 % — HIGH (ref 43–77)
NRBC # BLD: 0 /100 WBCS — SIGNIFICANT CHANGE UP (ref 0–0)
PLATELET # BLD AUTO: 170 K/UL — SIGNIFICANT CHANGE UP (ref 150–400)
POTASSIUM SERPL-MCNC: 3.5 MMOL/L — SIGNIFICANT CHANGE UP (ref 3.5–5.3)
POTASSIUM SERPL-SCNC: 3.5 MMOL/L — SIGNIFICANT CHANGE UP (ref 3.5–5.3)
RBC # BLD: 3.23 M/UL — LOW (ref 4.2–5.8)
RBC # FLD: 13.1 % — SIGNIFICANT CHANGE UP (ref 10.3–14.5)
SODIUM SERPL-SCNC: 142 MMOL/L — SIGNIFICANT CHANGE UP (ref 135–145)
WBC # BLD: 11.58 K/UL — HIGH (ref 3.8–10.5)
WBC # FLD AUTO: 11.58 K/UL — HIGH (ref 3.8–10.5)

## 2020-03-12 RX ADMIN — GABAPENTIN 600 MILLIGRAM(S): 400 CAPSULE ORAL at 05:44

## 2020-03-12 RX ADMIN — Medication 225 MILLIGRAM(S): at 12:14

## 2020-03-12 RX ADMIN — PANTOPRAZOLE SODIUM 40 MILLIGRAM(S): 20 TABLET, DELAYED RELEASE ORAL at 05:44

## 2020-03-12 RX ADMIN — TRAMADOL HYDROCHLORIDE 50 MILLIGRAM(S): 50 TABLET ORAL at 18:58

## 2020-03-12 RX ADMIN — BUPROPION HYDROCHLORIDE 150 MILLIGRAM(S): 150 TABLET, EXTENDED RELEASE ORAL at 12:24

## 2020-03-12 RX ADMIN — Medication 10 MILLIGRAM(S): at 05:44

## 2020-03-12 RX ADMIN — TRAMADOL HYDROCHLORIDE 50 MILLIGRAM(S): 50 TABLET ORAL at 18:40

## 2020-03-12 RX ADMIN — Medication 3 MILLIGRAM(S): at 23:34

## 2020-03-12 RX ADMIN — SENNA PLUS 2 TABLET(S): 8.6 TABLET ORAL at 21:17

## 2020-03-12 RX ADMIN — Medication 1 TABLET(S): at 12:14

## 2020-03-12 RX ADMIN — GABAPENTIN 600 MILLIGRAM(S): 400 CAPSULE ORAL at 21:17

## 2020-03-12 RX ADMIN — TRAMADOL HYDROCHLORIDE 50 MILLIGRAM(S): 50 TABLET ORAL at 23:36

## 2020-03-12 RX ADMIN — Medication 20 MILLIGRAM(S): at 05:45

## 2020-03-12 RX ADMIN — GABAPENTIN 600 MILLIGRAM(S): 400 CAPSULE ORAL at 13:49

## 2020-03-12 RX ADMIN — ATORVASTATIN CALCIUM 10 MILLIGRAM(S): 80 TABLET, FILM COATED ORAL at 21:17

## 2020-03-12 RX ADMIN — Medication 10 MILLIGRAM(S): at 18:28

## 2020-03-12 NOTE — DISCHARGE NOTE PROVIDER - NSDCCPCAREPLAN_GEN_ALL_CORE_FT
PRINCIPAL DISCHARGE DIAGNOSIS  Diagnosis: Spinal stenosis  Assessment and Plan of Treatment: improvement s/p Anterior/Posterior Fusion L5-S1

## 2020-03-12 NOTE — DISCHARGE NOTE PROVIDER - NSDCMRMEDTOKEN_GEN_ALL_CORE_FT
amLODIPine 10 mg oral tablet: 1 tab(s) orally once a day  Concerta 18 mg/24 hr oral tablet, extended release: 1 tab(s) orally once a day (in the morning)  Effexor: 225 milligram(s) orally once a day  gabapentin 600 mg oral tablet: 1 tab(s) orally 3 times a day  Lunesta 3 mg oral tablet: 1 tab(s) orally once a day (at bedtime)  omeprazole 40 mg oral delayed release capsule: 1 cap(s) orally once a day  pravastatin 10 mg oral tablet: 1 tab(s) orally once a day  prazosin: 3 milligram(s) orally once a day (at bedtime)  predniSONE 10 mg oral tablet: 1 tab(s) orally once a day  Rituxan 10 mg/mL intravenous solution: every 6 months  Symbicort 160 mcg-4.5 mcg/inh inhalation aerosol: 2 puff(s) inhaled 2 times a day  tramadol-acetaminophen 37.5 mg-325 mg oral tablet: 1 tab(s) orally every 4 hours, As Needed  Wellbutrin 75 mg oral tablet: 2 tab(s) orally once a day acetaminophen 325 mg oral tablet: 2 tab(s) orally every 6 hours, As needed, Temp greater or equal to 38C (100.4F), Mild Pain (1 - 3)  amLODIPine 10 mg oral tablet: 1 tab(s) orally once a day  Concerta 18 mg/24 hr oral tablet, extended release: 1 tab(s) orally once a day (in the morning)  Dilaudid 2 mg oral tablet: 1-2 tab(s) orally every 4-6 hours, As needed for moderate to severe pain MDD:6  Effexor: 225 milligram(s) orally once a day  gabapentin 600 mg oral tablet: 1 tab(s) orally 3 times a day  Lunesta 3 mg oral tablet: 1 tab(s) orally once a day (at bedtime)  Medrol Dosepak 4 mg oral tablet: 4 milligram(s) oral tablet- PLEASE TAKE AS DIRECTED ON DOSE PACK  omeprazole 40 mg oral delayed release capsule: 1 cap(s) orally once a day  pravastatin 10 mg oral tablet: 1 tab(s) orally once a day  prazosin: 3 milligram(s) orally once a day (at bedtime)  Rituxan 10 mg/mL intravenous solution: every 6 months  Symbicort 160 mcg-4.5 mcg/inh inhalation aerosol: 2 puff(s) inhaled 2 times a day  Wellbutrin 75 mg oral tablet: 2 tab(s) orally once a day acetaminophen 325 mg oral tablet: 2 tab(s) orally every 6 hours, As needed, Temp greater or equal to 38C (100.4F), Mild Pain (1 - 3)  amLODIPine 10 mg oral tablet: 1 tab(s) orally once a day  Concerta 18 mg/24 hr oral tablet, extended release: 1 tab(s) orally once a day (in the morning)  cyclobenzaprine 10 mg oral tablet: 1 tab(s) orally 3 times a day, As Needed -for muscle spasm   Dilaudid 2 mg oral tablet: 1-2 tab(s) orally every 4-6 hours, As needed for moderate to severe pain MDD:6  Effexor: 225 milligram(s) orally once a day  gabapentin 600 mg oral tablet: 1 tab(s) orally 3 times a day  Lunesta 3 mg oral tablet: 1 tab(s) orally once a day (at bedtime)  Medrol Dosepak 4 mg oral tablet: 4 milligram(s) oral tablet- PLEASE TAKE AS DIRECTED ON DOSE PACK  MS Contin 15 mg oral tablet, extended release: 1 tab(s) orally every 8 hours MDD:3   omeprazole 40 mg oral delayed release capsule: 1 cap(s) orally once a day  pravastatin 10 mg oral tablet: 1 tab(s) orally once a day  prazosin: 3 milligram(s) orally once a day (at bedtime)  Rituxan 10 mg/mL intravenous solution: every 6 months  Symbicort 160 mcg-4.5 mcg/inh inhalation aerosol: 2 puff(s) inhaled 2 times a day  Wellbutrin 75 mg oral tablet: 2 tab(s) orally once a day

## 2020-03-12 NOTE — PROGRESS NOTE ADULT - SUBJECTIVE AND OBJECTIVE BOX
POST OPERATIVE DAY #:   STATUS POST: Left    Right  TKA/THR                        SUBJECTIVE: Patient seen and examined.   Denies any sob/cp/n/v/numbness or tingling in b/l     OBJECTIVE:     Vital Signs Last 24 Hrs  T(C): 36.8 (12 Mar 2020 04:45), Max: 36.9 (11 Mar 2020 22:32)  T(F): 98.3 (12 Mar 2020 04:45), Max: 98.4 (11 Mar 2020 22:32)  HR: 99 (12 Mar 2020 04:45) (93 - 124)  BP: 121/63 (12 Mar 2020 04:45) (118/58 - 149/79)  BP(mean): 89 (11 Mar 2020 21:00) (89 - 89)  RR: 16 (12 Mar 2020 04:45) (16 - 20)  SpO2: 95% (12 Mar 2020 04:45) (93% - 95%)    Affected extremity:          Dressing: clean/dry/intact          Sensation: intact to light touch to patient's baseline         Motor exam: EHL/TA/GS   Pulses             I&O's Detail    11 Mar 2020 07:01  -  12 Mar 2020 07:00  --------------------------------------------------------  IN:    lactated ringers.: 2030 mL    Oral Fluid: 720 mL  Total IN: 2750 mL    OUT:    Accordian: 440 mL    Accordian: 178 mL    Indwelling Catheter - Urethral: 1925 mL    Voided: 400 mL  Total OUT: 2943 mL    Total NET: -193 mL          LABS:                        10.3   11.58 )-----------( 170      ( 12 Mar 2020 06:09 )             30.9     03-12    142  |  103  |  9   ----------------------------<  114<H>  3.5   |  27  |  0.67    Ca    8.4      12 Mar 2020 06:09            MEDICATIONS:    acetaminophen   Tablet .. 650 milliGRAM(s) Oral every 6 hours PRN  buPROPion XL . 150 milliGRAM(s) Oral daily  cyclobenzaprine 10 milliGRAM(s) Oral every 8 hours PRN  gabapentin 600 milliGRAM(s) Oral three times a day  HYDROmorphone  Injectable 0.5 milliGRAM(s) IV Push every 4 hours PRN  HYDROmorphone  Injectable 0.5 milliGRAM(s) IV Push every 15 minutes PRN  methylphenidate 10 milliGRAM(s) Oral two times a day  traMADol 25 milliGRAM(s) Oral every 4 hours PRN  traMADol 50 milliGRAM(s) Oral every 4 hours PRN  venlafaxine XR. 225 milliGRAM(s) Oral daily  zolpidem 5 milliGRAM(s) Oral at bedtime PRN          ASSESSMENT AND PLAN: 60yo Male s/p     1. Analgesic pain control  2. DVT prophylaxis: ASA      HSQ       Coumadin      Lovenox      SCDs      Other:   3. Weight Bearing Status:  Weight bearing as tolerated       NWB         Partial Weight Bearing  4. Disposition: Home          Subacute Rehab      pending PT evaluation POST OPERATIVE DAY #: 2  STATUS POST: L5-S1 ALIF/PSF               SUBJECTIVE: Patient seen and examined. Pt. stable, laying on his side which is more comfortable. Pt. states he was passing a lot of gas yesterday and last night. Denies any sob/cp/n/v/numbness or tingling in b/l les.     OBJECTIVE:     Vital Signs Last 24 Hrs  T(C): 36.8 (12 Mar 2020 04:45), Max: 36.9 (11 Mar 2020 22:32)  T(F): 98.3 (12 Mar 2020 04:45), Max: 98.4 (11 Mar 2020 22:32)  HR: 99 (12 Mar 2020 04:45) (93 - 124)  BP: 121/63 (12 Mar 2020 04:45) (118/58 - 149/79)  BP(mean): 89 (11 Mar 2020 21:00) (89 - 89)  RR: 16 (12 Mar 2020 04:45) (16 - 20)  SpO2: 95% (12 Mar 2020 04:45) (93% - 95%)    Affected extremity: bilateral les         Dressing: clean/dry/intact          Sensation: intact to light touch to patient's baseline         Motor exam: EHL/TA/GS 5/5  Pulses 2+    I&O's Detail    11 Mar 2020 07:01  -  12 Mar 2020 07:00  --------------------------------------------------------  IN:    lactated ringers.: 2030 mL    Oral Fluid: 720 mL  Total IN: 2750 mL    OUT:    Accordian: 440 mL    Accordian: 178 mL    Indwelling Catheter - Urethral: 1925 mL    Voided: 400 mL  Total OUT: 2943 mL    Total NET: -193 mL          LABS:                        10.3   11.58 )-----------( 170      ( 12 Mar 2020 06:09 )             30.9     03-12    142  |  103  |  9   ----------------------------<  114<H>  3.5   |  27  |  0.67    Ca    8.4      12 Mar 2020 06:09            MEDICATIONS:    acetaminophen   Tablet .. 650 milliGRAM(s) Oral every 6 hours PRN  buPROPion XL . 150 milliGRAM(s) Oral daily  cyclobenzaprine 10 milliGRAM(s) Oral every 8 hours PRN  gabapentin 600 milliGRAM(s) Oral three times a day  HYDROmorphone  Injectable 0.5 milliGRAM(s) IV Push every 4 hours PRN  HYDROmorphone  Injectable 0.5 milliGRAM(s) IV Push every 15 minutes PRN  methylphenidate 10 milliGRAM(s) Oral two times a day  traMADol 25 milliGRAM(s) Oral every 4 hours PRN  traMADol 50 milliGRAM(s) Oral every 4 hours PRN  venlafaxine XR. 225 milliGRAM(s) Oral daily  zolpidem 5 milliGRAM(s) Oral at bedtime PRN          ASSESSMENT AND PLAN: 60yo Male s/p L5-S1 ALIF/PSF      1. Analgesic pain control  2. DVT prophylaxis:      SCDs    3. Weight Bearing Status:  Weight bearing as tolerated      4. Disposition: Home  when cleared by PT  5. Drains keeping in today  6. Will advance to mechanical soft diet today POST OPERATIVE DAY #: 2  STATUS POST: L5-S1 ALIF/PSF               SUBJECTIVE: Patient seen and examined. Pt. stable, laying on his side which is more comfortable. Pt. states he was passing a lot of gas yesterday and last night. Denies any sob/cp/n/v/numbness or tingling in b/l les.     OBJECTIVE:     Vital Signs Last 24 Hrs  T(C): 36.8 (12 Mar 2020 04:45), Max: 36.9 (11 Mar 2020 22:32)  T(F): 98.3 (12 Mar 2020 04:45), Max: 98.4 (11 Mar 2020 22:32)  HR: 99 (12 Mar 2020 04:45) (93 - 124)  BP: 121/63 (12 Mar 2020 04:45) (118/58 - 149/79)  BP(mean): 89 (11 Mar 2020 21:00) (89 - 89)  RR: 16 (12 Mar 2020 04:45) (16 - 20)  SpO2: 95% (12 Mar 2020 04:45) (93% - 95%)    Affected extremity: bilateral les         Dressing: clean/dry/intact with 2 drains posterior and Onq pump anterior         Sensation: intact to light touch to patient's baseline         Motor exam: EHL/TA/GS 5/5  Pulses 2+    I&O's Detail    11 Mar 2020 07:01  -  12 Mar 2020 07:00  --------------------------------------------------------  IN:    lactated ringers.: 2030 mL    Oral Fluid: 720 mL  Total IN: 2750 mL    OUT:    Accordian: 440 mL    Accordian: 178 mL    Indwelling Catheter - Urethral: 1925 mL    Voided: 400 mL  Total OUT: 2943 mL    Total NET: -193 mL          LABS:                        10.3   11.58 )-----------( 170      ( 12 Mar 2020 06:09 )             30.9     03-12    142  |  103  |  9   ----------------------------<  114<H>  3.5   |  27  |  0.67    Ca    8.4      12 Mar 2020 06:09            MEDICATIONS:    acetaminophen   Tablet .. 650 milliGRAM(s) Oral every 6 hours PRN  buPROPion XL . 150 milliGRAM(s) Oral daily  cyclobenzaprine 10 milliGRAM(s) Oral every 8 hours PRN  gabapentin 600 milliGRAM(s) Oral three times a day  HYDROmorphone  Injectable 0.5 milliGRAM(s) IV Push every 4 hours PRN  HYDROmorphone  Injectable 0.5 milliGRAM(s) IV Push every 15 minutes PRN  methylphenidate 10 milliGRAM(s) Oral two times a day  traMADol 25 milliGRAM(s) Oral every 4 hours PRN  traMADol 50 milliGRAM(s) Oral every 4 hours PRN  venlafaxine XR. 225 milliGRAM(s) Oral daily  zolpidem 5 milliGRAM(s) Oral at bedtime PRN          ASSESSMENT AND PLAN: 58yo Male s/p L5-S1 ALIF/PSF      1. Analgesic pain control  2. DVT prophylaxis:      SCDs    3. Weight Bearing Status:  Weight bearing as tolerated      4. Disposition: Home  when cleared by PT  5. Drains keeping in today  6. Will advance to mechanical soft diet today. Dressing changed today.

## 2020-03-12 NOTE — DISCHARGE NOTE PROVIDER - NSDCACTIVITY_GEN_ALL_CORE
Walking - Indoors allowed/No heavy lifting/straining/Stairs allowed/Do not drive or operate machinery/Walking - Outdoors allowed No heavy lifting/straining/Stairs allowed/Walking - Indoors allowed/Showering allowed/Do not drive or operate machinery/Walking - Outdoors allowed

## 2020-03-12 NOTE — DISCHARGE NOTE PROVIDER - CARE PROVIDER_API CALL
Seth Ratliff)  Orthopaedic Surgery  [address] [place] [address]  Phone: [phone]  Fax: [phone]  Follow Up Time: 2 weeks

## 2020-03-12 NOTE — PROGRESS NOTE ADULT - SUBJECTIVE AND OBJECTIVE BOX
SUBJECTIVE:   Patient still says feeling well this morning   Still passing gas, no BM, no N/V  Tolerating clears     MEDICATIONS  (STANDING):  atorvastatin 10 milliGRAM(s) Oral at bedtime  budesonide 160 MICROgram(s)/formoterol 4.5 MICROgram(s) Inhaler 2 Puff(s) Inhalation two times a day  BUpivacaine 0.25% On-Q Pump 400 milliLiter(s) (4 mL/Hr) IntraDermal. <Continuous>  BUpivacaine liposome 1.3% Injectable (no eMAR) 20 milliLiter(s) Local Injection once  buPROPion XL . 150 milliGRAM(s) Oral daily  doxazosin 3 milliGRAM(s) Oral at bedtime  gabapentin 600 milliGRAM(s) Oral three times a day  lactated ringers. 1000 milliLiter(s) (145 mL/Hr) IV Continuous <Continuous>  methylphenidate 10 milliGRAM(s) Oral two times a day  multivitamin 1 Tablet(s) Oral daily  pantoprazole    Tablet 40 milliGRAM(s) Oral before breakfast  predniSONE   Tablet 20 milliGRAM(s) Oral daily  senna 2 Tablet(s) Oral at bedtime  venlafaxine XR. 225 milliGRAM(s) Oral daily    MEDICATIONS  (PRN):  acetaminophen   Tablet .. 650 milliGRAM(s) Oral every 6 hours PRN Temp greater or equal to 38C (100.4F), Mild Pain (1 - 3)  cyclobenzaprine 10 milliGRAM(s) Oral every 8 hours PRN Muscle Spasm  HYDROmorphone  Injectable 0.5 milliGRAM(s) IV Push every 4 hours PRN Breakthrough pain  HYDROmorphone  Injectable 0.5 milliGRAM(s) IV Push every 15 minutes PRN Breakthrough Pain  magnesium hydroxide Suspension 30 milliLiter(s) Oral every 12 hours PRN Constipation  traMADol 25 milliGRAM(s) Oral every 4 hours PRN Moderate Pain (4 - 6)  traMADol 50 milliGRAM(s) Oral every 4 hours PRN Severe Pain (7 - 10)  zolpidem 5 milliGRAM(s) Oral at bedtime PRN Insomnia      Vital Signs Last 24 Hrs  T(C): 36.5 (11 Mar 2020 08:52), Max: 37.2 (10 Mar 2020 20:28)  T(F): 97.7 (11 Mar 2020 08:52), Max: 99 (10 Mar 2020 20:28)  HR: 124 (11 Mar 2020 09:40) (79 - 124)  BP: 127/68 (11 Mar 2020 08:52) (127/60 - 152/71)  BP(mean): 108 (10 Mar 2020 20:28) (87 - 109)  RR: 18 (11 Mar 2020 08:52) (15 - 20)  SpO2: 94% (11 Mar 2020 08:52) (93% - 97%)  I&O's Detail    10 Mar 2020 07:01  -  11 Mar 2020 07:00  --------------------------------------------------------  IN:    lactated ringers.: 2030 mL  Total IN: 2030 mL    OUT:    Accordian: 415 mL    Accordian: 215 mL    Indwelling Catheter - Urethral: 150 mL    Intermittent Catheterization - Urethral: 500 mL  Total OUT: 1280 mL    Total NET: 750 mL      11 Mar 2020 07:01  -  11 Mar 2020 13:28  --------------------------------------------------------  IN:  Total IN: 0 mL    OUT:    Accordian: 150 mL  Total OUT: 150 mL    Total NET: -150 mL    PHYSICAL EXAM:   General: NAD, resting comfortably in bed  HEENT: normocephalic, no scleral ictera   Neuro: alert and oriented, no focal deficit   C/V: regular rate and rhythm, no murmur to auscultation, no carotid bruit. Palpable DP pulses b/l    Pulm: Nonlabored breathing, lungs are clear to auscultation   Abd: abdomen soft, minimaly tender to palpation, no hepatomegaly   MSK: no swelling, no deformity  Skin: dresssing in place, no saturated, no surrounding erythema   Psych: appropriate mood and affect, cooperative         LABS:                        11.5   14.94 )-----------( 214      ( 11 Mar 2020 06:44 )             34.2     03-11    139  |  101  |  11  ----------------------------<  137<H>  3.6   |  27  |  0.76    Ca    8.4      11 Mar 2020 06:44            RADIOLOGY & ADDITIONAL STUDIES:

## 2020-03-12 NOTE — DISCHARGE NOTE PROVIDER - NSDCQMCOGNITION_NEU_ALL_CORE
pt w no med hx, who for last three d has had slight blood when blowing nose. nosebleed toda y at 5am after pushing heavy object. resolved. again 1 h prir to ed arrival epistaxis . no blood thinners. no trauma. no light headed. no cp/sob. feels well. on ed arrival had tongue depressor compression placed on nose No difficulties

## 2020-03-12 NOTE — DISCHARGE NOTE PROVIDER - NSDCFUADDINST_GEN_ALL_CORE_FT
No strenuous activity (bending/twisting), heavy lifting, driving or returning to work until cleared by MD.  Change dressings daily with gauze/tape till post-op day #5, then leave incision open to air.  You may shower post-op day#5, keep incision clean and dry.   Try to have regular bowel movements, take stool softener or laxative if necessary.  May apply ice to affected areas to decrease swelling.  Call to schedule an appt with Dr. Ratliff for follow up, if you have staples or sutures they will be removed in office.  Contact your doctor if you experience: fever greater than 101.5, chills, chest pain, difficulty breathing, redness or excessive drainage around the incision, other concerns.  Follow up with your primary care provider. No strenuous activity (bending/twisting), heavy lifting, driving or returning to work until cleared by MD.  May shower. Let soapy water fall over incisions and pat dry. Do not need to cover. Dressings will be removed in the office.  Try to have regular bowel movements, take stool softener or laxative if necessary.  May apply ice to affected areas to decrease swelling.  Call to schedule an appt with Dr. Ratliff for follow up.  Contact your doctor if you experience: fever greater than 101.5, chills, chest pain, difficulty breathing, redness or excessive drainage around the incision, other concerns.  Follow up with your primary care provider.

## 2020-03-12 NOTE — PROGRESS NOTE ADULT - SUBJECTIVE AND OBJECTIVE BOX
Ortho Progress Note    Procedure: ALIF L5-S1 and revision PSF L5-S1  Surgeon: Gaudencio     Pt comfortable without complaints, pain controlled.   Denies CP, SOB, N/V, numbness/tingling     Vital Signs Last 24 Hrs  T(C): 36.8 (12 Mar 2020 15:22), Max: 36.9 (11 Mar 2020 22:32)  T(F): 98.2 (12 Mar 2020 15:22), Max: 98.4 (11 Mar 2020 22:32)  HR: 127 (12 Mar 2020 15:22) (93 - 127)  BP: 127/84 (12 Mar 2020 15:22) (118/58 - 149/79)  BP(mean): 89 (11 Mar 2020 21:00) (89 - 89)  RR: 17 (12 Mar 2020 15:22) (15 - 18)  SpO2: 95% (12 Mar 2020 15:22) (93% - 95%)    General: Pt Alert and oriented, NAD  Abd soft, NT/ND  Abdominal DSG C/D/I  Back DSG C/D/I  HV x2 in place   Pulses: 2+ DP/PT  Sensation intact BL LE  Motor strength 5/5 and symmetric   Toes WWP                                     10.3   11.58 )-----------( 170      ( 12 Mar 2020 06:09 )             30.9         A/P: 59yMale s/p ALIF L5-S1 and revision PSF L5-S1 3/10/20  - Stable  - Pain Control  - DVT ppx: SCDs  - Post op abx: ancef periop  - PT, WBS: WBAT  - monitor drains   - dispo home pending clearance    Ortho Pager [phone]

## 2020-03-12 NOTE — PROGRESS NOTE ADULT - ASSESSMENT
This is a 60 y/o M s/p L5-S1 fusion through anterior approach   Gen surg consulted intra op for anterior approach   Doing well, abdomen soft, palpable DP pulses bilaterally   Passing gas, no N/V, no BM, tolerating clears      Plan:  - Continue clears until having BMs   - Management per primary team   - surgery team4c will continue to follow

## 2020-03-12 NOTE — DISCHARGE NOTE PROVIDER - HOSPITAL COURSE
Admitted    Surgery Anterior/Posterior Fusion L5-S1    Yola-op Antibiotics    Pain control    DVT prophylaxis    OOB/Physical Therapy Admitted    Surgery 3/10 Anterior/Posterior Fusion L5-S1    Yola-op Antibiotics    Pain control    IV steroids    DVT prophylaxis    OOB/Physical Therapy

## 2020-03-12 NOTE — DISCHARGE NOTE PROVIDER - NSDCCPTREATMENT_GEN_ALL_CORE_FT
PRINCIPAL PROCEDURE  Procedure: Fusion, spine, lumbar, simple, anterior approach  Findings and Treatment: L5-S1      SECONDARY PROCEDURE  Procedure: Fusion, spine, lumbar, posterior approach  Findings and Treatment: L5-S1

## 2020-03-13 LAB
ANION GAP SERPL CALC-SCNC: 9 MMOL/L — SIGNIFICANT CHANGE UP (ref 5–17)
BASOPHILS # BLD AUTO: 0.05 K/UL — SIGNIFICANT CHANGE UP (ref 0–0.2)
BASOPHILS NFR BLD AUTO: 0.4 % — SIGNIFICANT CHANGE UP (ref 0–2)
BUN SERPL-MCNC: 12 MG/DL — SIGNIFICANT CHANGE UP (ref 7–23)
CALCIUM SERPL-MCNC: 8.4 MG/DL — SIGNIFICANT CHANGE UP (ref 8.4–10.5)
CHLORIDE SERPL-SCNC: 100 MMOL/L — SIGNIFICANT CHANGE UP (ref 96–108)
CO2 SERPL-SCNC: 29 MMOL/L — SIGNIFICANT CHANGE UP (ref 22–31)
CREAT SERPL-MCNC: 0.78 MG/DL — SIGNIFICANT CHANGE UP (ref 0.5–1.3)
EOSINOPHIL # BLD AUTO: 0.16 K/UL — SIGNIFICANT CHANGE UP (ref 0–0.5)
EOSINOPHIL NFR BLD AUTO: 1.4 % — SIGNIFICANT CHANGE UP (ref 0–6)
GLUCOSE SERPL-MCNC: 114 MG/DL — HIGH (ref 70–99)
HCT VFR BLD CALC: 32.1 % — LOW (ref 39–50)
HGB BLD-MCNC: 10.8 G/DL — LOW (ref 13–17)
IMM GRANULOCYTES NFR BLD AUTO: 0.7 % — SIGNIFICANT CHANGE UP (ref 0–1.5)
LYMPHOCYTES # BLD AUTO: 1.36 K/UL — SIGNIFICANT CHANGE UP (ref 1–3.3)
LYMPHOCYTES # BLD AUTO: 12.1 % — LOW (ref 13–44)
MCHC RBC-ENTMCNC: 32.1 PG — SIGNIFICANT CHANGE UP (ref 27–34)
MCHC RBC-ENTMCNC: 33.6 GM/DL — SIGNIFICANT CHANGE UP (ref 32–36)
MCV RBC AUTO: 95.5 FL — SIGNIFICANT CHANGE UP (ref 80–100)
MONOCYTES # BLD AUTO: 1.14 K/UL — HIGH (ref 0–0.9)
MONOCYTES NFR BLD AUTO: 10.2 % — SIGNIFICANT CHANGE UP (ref 2–14)
NEUTROPHILS # BLD AUTO: 8.42 K/UL — HIGH (ref 1.8–7.4)
NEUTROPHILS NFR BLD AUTO: 75.2 % — SIGNIFICANT CHANGE UP (ref 43–77)
NRBC # BLD: 0 /100 WBCS — SIGNIFICANT CHANGE UP (ref 0–0)
PLATELET # BLD AUTO: 179 K/UL — SIGNIFICANT CHANGE UP (ref 150–400)
POTASSIUM SERPL-MCNC: 3.2 MMOL/L — LOW (ref 3.5–5.3)
POTASSIUM SERPL-SCNC: 3.2 MMOL/L — LOW (ref 3.5–5.3)
RBC # BLD: 3.36 M/UL — LOW (ref 4.2–5.8)
RBC # FLD: 13 % — SIGNIFICANT CHANGE UP (ref 10.3–14.5)
SODIUM SERPL-SCNC: 138 MMOL/L — SIGNIFICANT CHANGE UP (ref 135–145)
WBC # BLD: 11.21 K/UL — HIGH (ref 3.8–10.5)
WBC # FLD AUTO: 11.21 K/UL — HIGH (ref 3.8–10.5)

## 2020-03-13 PROCEDURE — 72192 CT PELVIS W/O DYE: CPT | Mod: 26

## 2020-03-13 PROCEDURE — 72131 CT LUMBAR SPINE W/O DYE: CPT | Mod: 26

## 2020-03-13 RX ORDER — DEXAMETHASONE 0.5 MG/5ML
10 ELIXIR ORAL ONCE
Refills: 0 | Status: COMPLETED | OUTPATIENT
Start: 2020-03-13 | End: 2020-03-13

## 2020-03-13 RX ORDER — DIAZEPAM 5 MG
5 TABLET ORAL EVERY 8 HOURS
Refills: 0 | Status: DISCONTINUED | OUTPATIENT
Start: 2020-03-13 | End: 2020-03-19

## 2020-03-13 RX ORDER — DIAZEPAM 5 MG
5 TABLET ORAL ONCE
Refills: 0 | Status: DISCONTINUED | OUTPATIENT
Start: 2020-03-13 | End: 2020-03-13

## 2020-03-13 RX ORDER — POTASSIUM CHLORIDE 20 MEQ
40 PACKET (EA) ORAL ONCE
Refills: 0 | Status: COMPLETED | OUTPATIENT
Start: 2020-03-13 | End: 2020-03-13

## 2020-03-13 RX ADMIN — TRAMADOL HYDROCHLORIDE 50 MILLIGRAM(S): 50 TABLET ORAL at 23:03

## 2020-03-13 RX ADMIN — Medication 102 MILLIGRAM(S): at 22:01

## 2020-03-13 RX ADMIN — Medication 5 MILLIGRAM(S): at 17:26

## 2020-03-13 RX ADMIN — PANTOPRAZOLE SODIUM 40 MILLIGRAM(S): 20 TABLET, DELAYED RELEASE ORAL at 05:41

## 2020-03-13 RX ADMIN — Medication 20 MILLIGRAM(S): at 05:41

## 2020-03-13 RX ADMIN — ATORVASTATIN CALCIUM 10 MILLIGRAM(S): 80 TABLET, FILM COATED ORAL at 22:04

## 2020-03-13 RX ADMIN — Medication 225 MILLIGRAM(S): at 13:21

## 2020-03-13 RX ADMIN — Medication 1 TABLET(S): at 13:20

## 2020-03-13 RX ADMIN — TRAMADOL HYDROCHLORIDE 50 MILLIGRAM(S): 50 TABLET ORAL at 14:43

## 2020-03-13 RX ADMIN — BUPROPION HYDROCHLORIDE 150 MILLIGRAM(S): 150 TABLET, EXTENDED RELEASE ORAL at 13:20

## 2020-03-13 RX ADMIN — TRAMADOL HYDROCHLORIDE 50 MILLIGRAM(S): 50 TABLET ORAL at 00:06

## 2020-03-13 RX ADMIN — Medication 40 MILLIEQUIVALENT(S): at 13:20

## 2020-03-13 RX ADMIN — Medication 10 MILLIGRAM(S): at 17:26

## 2020-03-13 RX ADMIN — TRAMADOL HYDROCHLORIDE 50 MILLIGRAM(S): 50 TABLET ORAL at 14:56

## 2020-03-13 RX ADMIN — Medication 3 MILLIGRAM(S): at 22:02

## 2020-03-13 RX ADMIN — TRAMADOL HYDROCHLORIDE 50 MILLIGRAM(S): 50 TABLET ORAL at 22:03

## 2020-03-13 RX ADMIN — Medication 10 MILLIGRAM(S): at 05:41

## 2020-03-13 RX ADMIN — Medication 5 MILLIGRAM(S): at 11:05

## 2020-03-13 RX ADMIN — SENNA PLUS 2 TABLET(S): 8.6 TABLET ORAL at 22:03

## 2020-03-13 RX ADMIN — GABAPENTIN 600 MILLIGRAM(S): 400 CAPSULE ORAL at 22:01

## 2020-03-13 RX ADMIN — GABAPENTIN 600 MILLIGRAM(S): 400 CAPSULE ORAL at 05:41

## 2020-03-13 RX ADMIN — GABAPENTIN 600 MILLIGRAM(S): 400 CAPSULE ORAL at 13:20

## 2020-03-13 NOTE — DIETITIAN INITIAL EVALUATION ADULT. - OTHER INFO
59yMale hx COPD, depression/anxiety, PTSD, GERD, HTN, HLD, systemic lupus erythematosus, now s/p L5-S1 ALIF/PSF on 3/10. Attempted to see pt multiple times throughout the day but pt sleeping soundly every time. Per PA note, pt endorsing pain, denying N/V. Last BM 3/13. Malachi score 20. Pt on mechanical soft diet, no issues with chewing/swallowing per PA, RN unsure about % of PO intake. Unable to obtain information regarding diet/wt hx PTA as pt sleeping soundly. Please see below for full nutritional recommendations- d/w team. RD to monitor and f/u per protocol.

## 2020-03-13 NOTE — PROGRESS NOTE ADULT - SUBJECTIVE AND OBJECTIVE BOX
SUBJECTIVE:   Patient had a small BM overnight   Pain well contolled, no N/V  Tolerating po intakes     MEDICATIONS  (STANDING):  atorvastatin 10 milliGRAM(s) Oral at bedtime  budesonide 160 MICROgram(s)/formoterol 4.5 MICROgram(s) Inhaler 2 Puff(s) Inhalation two times a day  BUpivacaine 0.25% On-Q Pump 400 milliLiter(s) (4 mL/Hr) IntraDermal. <Continuous>  BUpivacaine liposome 1.3% Injectable (no eMAR) 20 milliLiter(s) Local Injection once  buPROPion XL . 150 milliGRAM(s) Oral daily  doxazosin 3 milliGRAM(s) Oral at bedtime  gabapentin 600 milliGRAM(s) Oral three times a day  lactated ringers. 1000 milliLiter(s) (145 mL/Hr) IV Continuous <Continuous>  methylphenidate 10 milliGRAM(s) Oral two times a day  multivitamin 1 Tablet(s) Oral daily  pantoprazole    Tablet 40 milliGRAM(s) Oral before breakfast  predniSONE   Tablet 20 milliGRAM(s) Oral daily  senna 2 Tablet(s) Oral at bedtime  venlafaxine XR. 225 milliGRAM(s) Oral daily    MEDICATIONS  (PRN):  acetaminophen   Tablet .. 650 milliGRAM(s) Oral every 6 hours PRN Temp greater or equal to 38C (100.4F), Mild Pain (1 - 3)  cyclobenzaprine 10 milliGRAM(s) Oral every 8 hours PRN Muscle Spasm  HYDROmorphone  Injectable 0.5 milliGRAM(s) IV Push every 4 hours PRN Breakthrough pain  HYDROmorphone  Injectable 0.5 milliGRAM(s) IV Push every 15 minutes PRN Breakthrough Pain  magnesium hydroxide Suspension 30 milliLiter(s) Oral every 12 hours PRN Constipation  traMADol 25 milliGRAM(s) Oral every 4 hours PRN Moderate Pain (4 - 6)  traMADol 50 milliGRAM(s) Oral every 4 hours PRN Severe Pain (7 - 10)  zolpidem 5 milliGRAM(s) Oral at bedtime PRN Insomnia      Vital Signs Last 24 Hrs  T(C): 36.5 (11 Mar 2020 08:52), Max: 37.2 (10 Mar 2020 20:28)  T(F): 97.7 (11 Mar 2020 08:52), Max: 99 (10 Mar 2020 20:28)  HR: 124 (11 Mar 2020 09:40) (79 - 124)  BP: 127/68 (11 Mar 2020 08:52) (127/60 - 152/71)  BP(mean): 108 (10 Mar 2020 20:28) (87 - 109)  RR: 18 (11 Mar 2020 08:52) (15 - 20)  SpO2: 94% (11 Mar 2020 08:52) (93% - 97%)  I&O's Detail    10 Mar 2020 07:01  -  11 Mar 2020 07:00  --------------------------------------------------------  IN:    lactated ringers.: 2030 mL  Total IN: 2030 mL    OUT:    Accordian: 415 mL    Accordian: 215 mL    Indwelling Catheter - Urethral: 150 mL    Intermittent Catheterization - Urethral: 500 mL  Total OUT: 1280 mL    Total NET: 750 mL      11 Mar 2020 07:01  -  11 Mar 2020 13:28  --------------------------------------------------------  IN:  Total IN: 0 mL    OUT:    Accordian: 150 mL  Total OUT: 150 mL    Total NET: -150 mL    PHYSICAL EXAM:   General: NAD, resting comfortably in bed  HEENT: normocephalic, no scleral ictera   Neuro: alert and oriented, no focal deficit   C/V: regular rate and rhythm, no murmur to auscultation, no carotid bruit. Palpable DP pulses b/l    Pulm: Nonlabored breathing, lungs are clear to auscultation   Abd: abdomen soft, minimaly tender to palpation, no hepatomegaly   MSK: no swelling, no deformity  Skin: dresssing in place, no saturated, no surrounding erythema   Psych: appropriate mood and affect, cooperative         LABS:                        11.5   14.94 )-----------( 214      ( 11 Mar 2020 06:44 )             34.2     03-11    139  |  101  |  11  ----------------------------<  137<H>  3.6   |  27  |  0.76    Ca    8.4      11 Mar 2020 06:44            RADIOLOGY & ADDITIONAL STUDIES:

## 2020-03-13 NOTE — DIETITIAN INITIAL EVALUATION ADULT. - ENERGY NEEDS
Ht (3/10): 188cm, Wt (3/10): 117.7kg, IBW: 86.4kg+/-10%, %IBW: 136%, BMI: 33.3   IBW used to calculate energy needs due to pt's current body weight exceeding 120% of IBW. Needs adjusted for post-op, overweight status. Aim for higher end of kcal/protein range.

## 2020-03-13 NOTE — PROGRESS NOTE ADULT - ASSESSMENT
This is a 58 y/o M s/p L5-S1 fusion through anterior approach   Gen surg consulted intra op for anterior approach   Doing well, abdomen soft, palpable DP pulses bilaterally   Had a small BM, no N/V, no BM, tolerating mechanical soft diet      Plan:  - Continue current management    - Management per primary team   - surgery team4c will continue to follow

## 2020-03-13 NOTE — PROGRESS NOTE ADULT - SUBJECTIVE AND OBJECTIVE BOX
Orthopaedic Surgery Progress Note    Post-operative day #3 s/p L5-S1 ALIF/PSF    Subjective:     Patient seen and examined. Patient complaining of pain. States his spinal stimulator is not working. He has contacted the rep for the company who will come and see him this morning.  Denies chest pain, shortness of breath, nausea/vomiting, numbness/tingling.    Objective:    Vital Signs Last 24 Hrs  T(C): 36.7 (03-13-20 @ 08:00), Max: 36.7 (03-13-20 @ 08:00)  T(F): 98 (03-13-20 @ 08:00), Max: 98 (03-13-20 @ 08:00)  HR: 100 (03-13-20 @ 08:00) (100 - 100)  BP: 106/80 (03-13-20 @ 08:00) (106/80 - 106/80)  AVSS    PE:  General: Patient alert and oriented, NAD  Dressing: Clean/dry/intact  Pulses: 2+ DP BLE   Sensation: SILT BLE   Motor: EHL/FHL/TA/GS 5/5 BLE                          10.8   11.21 )-----------( 179      ( 13 Mar 2020 07:49 )             32.1   13 Mar 2020 07:49    138    |  100    |  12     ----------------------------<  114    3.2     |  29     |  0.78     Ca    8.4        13 Mar 2020 07:49    HV x 2  118 in 24 hours, 28 overnight  20 in 24 hours, 0 overnight     A/P: 59yMale POD #3 s/p L5-S1 ALIF/PSF  1. Pain control as needed  2. DVT prophylaxis: SCDs  3. PT, weight-bearing status: WBAT - cleared   4. Dispo: Home when drain output decreases. Will recheck this afternoon.    Ortho Pager [phone]

## 2020-03-13 NOTE — DIETITIAN INITIAL EVALUATION ADULT. - PROBLEM SELECTOR PLAN 1
Admit to orthopedics.  Presents for elective anterior and posterior spinal fusion with instrumentation and cage and iliac crest bone graft.   Medically optimized and cleared for surgery

## 2020-03-13 NOTE — DIETITIAN INITIAL EVALUATION ADULT. - ADD RECOMMEND
1. Recommend add DASH/TLC to current diet order (mechanical soft) 2. Monitor need for CSTCHO dietary restriction while pt on Prednisone 3. RD to f/u with diet education per protocol

## 2020-03-13 NOTE — PROGRESS NOTE ADULT - SUBJECTIVE AND OBJECTIVE BOX
Ortho Progress Note    Procedure: ALIF L5-S1 and revision PSF L5-S1  Surgeon: Gaudencio     Pt comfortable without complaints, pain controlled.   Denies CP, SOB, N/V, numbness/tingling     Vital Signs Last 24 Hrs  T(C): 36.5 (13 Mar 2020 04:45), Max: 37.1 (12 Mar 2020 20:15)  T(F): 97.7 (13 Mar 2020 04:45), Max: 98.7 (12 Mar 2020 20:15)  HR: 108 (13 Mar 2020 04:45) (101 - 127)  BP: 115/76 (13 Mar 2020 04:45) (115/76 - 127/84)  BP(mean): --  RR: 17 (13 Mar 2020 04:45) (17 - 17)  SpO2: 95% (13 Mar 2020 04:45) (95% - 95%)      General: Pt Alert and oriented, NAD  Abd soft, NT/ND  Abdominal DSG C/D/I  Back DSG C/D/I  HV x2 in place   Pulses: 2+ DP/PT  Sensation intact BL LE  Motor strength 5/5 and symmetric   Toes WWP                                     10.3   11.58 )-----------( 170      ( 12 Mar 2020 06:09 )             30.9         A/P: 59yMale s/p ALIF L5-S1 and revision PSF L5-S1 3/10/20  - Stable  - Pain Control  - DVT ppx: SCDs  - Post op abx: ancef periop  - PT, WBS: WBAT  - monitor drains   - dispo home pending clearance and drain output decrease    Ortho Pager [phone]

## 2020-03-14 LAB
ANION GAP SERPL CALC-SCNC: 11 MMOL/L — SIGNIFICANT CHANGE UP (ref 5–17)
BUN SERPL-MCNC: 12 MG/DL — SIGNIFICANT CHANGE UP (ref 7–23)
CALCIUM SERPL-MCNC: 8.7 MG/DL — SIGNIFICANT CHANGE UP (ref 8.4–10.5)
CHLORIDE SERPL-SCNC: 101 MMOL/L — SIGNIFICANT CHANGE UP (ref 96–108)
CO2 SERPL-SCNC: 28 MMOL/L — SIGNIFICANT CHANGE UP (ref 22–31)
CREAT SERPL-MCNC: 0.74 MG/DL — SIGNIFICANT CHANGE UP (ref 0.5–1.3)
GLUCOSE SERPL-MCNC: 137 MG/DL — HIGH (ref 70–99)
HCT VFR BLD CALC: 34.2 % — LOW (ref 39–50)
HGB BLD-MCNC: 11.2 G/DL — LOW (ref 13–17)
MCHC RBC-ENTMCNC: 31.5 PG — SIGNIFICANT CHANGE UP (ref 27–34)
MCHC RBC-ENTMCNC: 32.7 GM/DL — SIGNIFICANT CHANGE UP (ref 32–36)
MCV RBC AUTO: 96.1 FL — SIGNIFICANT CHANGE UP (ref 80–100)
NRBC # BLD: 0 /100 WBCS — SIGNIFICANT CHANGE UP (ref 0–0)
PLATELET # BLD AUTO: 210 K/UL — SIGNIFICANT CHANGE UP (ref 150–400)
POTASSIUM SERPL-MCNC: 4.3 MMOL/L — SIGNIFICANT CHANGE UP (ref 3.5–5.3)
POTASSIUM SERPL-SCNC: 4.3 MMOL/L — SIGNIFICANT CHANGE UP (ref 3.5–5.3)
RBC # BLD: 3.56 M/UL — LOW (ref 4.2–5.8)
RBC # FLD: 12.6 % — SIGNIFICANT CHANGE UP (ref 10.3–14.5)
SODIUM SERPL-SCNC: 140 MMOL/L — SIGNIFICANT CHANGE UP (ref 135–145)
WBC # BLD: 11.23 K/UL — HIGH (ref 3.8–10.5)
WBC # FLD AUTO: 11.23 K/UL — HIGH (ref 3.8–10.5)

## 2020-03-14 RX ORDER — DEXAMETHASONE 0.5 MG/5ML
6 ELIXIR ORAL EVERY 12 HOURS
Refills: 0 | Status: COMPLETED | OUTPATIENT
Start: 2020-03-14 | End: 2020-03-15

## 2020-03-14 RX ADMIN — Medication 10 MILLIGRAM(S): at 05:10

## 2020-03-14 RX ADMIN — GABAPENTIN 600 MILLIGRAM(S): 400 CAPSULE ORAL at 21:30

## 2020-03-14 RX ADMIN — Medication 1 TABLET(S): at 11:08

## 2020-03-14 RX ADMIN — TRAMADOL HYDROCHLORIDE 50 MILLIGRAM(S): 50 TABLET ORAL at 11:40

## 2020-03-14 RX ADMIN — TRAMADOL HYDROCHLORIDE 50 MILLIGRAM(S): 50 TABLET ORAL at 19:57

## 2020-03-14 RX ADMIN — TRAMADOL HYDROCHLORIDE 50 MILLIGRAM(S): 50 TABLET ORAL at 05:10

## 2020-03-14 RX ADMIN — GABAPENTIN 600 MILLIGRAM(S): 400 CAPSULE ORAL at 05:10

## 2020-03-14 RX ADMIN — PANTOPRAZOLE SODIUM 40 MILLIGRAM(S): 20 TABLET, DELAYED RELEASE ORAL at 05:10

## 2020-03-14 RX ADMIN — Medication 10 MILLIGRAM(S): at 17:23

## 2020-03-14 RX ADMIN — Medication 5 MILLIGRAM(S): at 12:57

## 2020-03-14 RX ADMIN — TRAMADOL HYDROCHLORIDE 50 MILLIGRAM(S): 50 TABLET ORAL at 06:10

## 2020-03-14 RX ADMIN — ATORVASTATIN CALCIUM 10 MILLIGRAM(S): 80 TABLET, FILM COATED ORAL at 21:30

## 2020-03-14 RX ADMIN — Medication 225 MILLIGRAM(S): at 11:08

## 2020-03-14 RX ADMIN — Medication 20 MILLIGRAM(S): at 05:10

## 2020-03-14 RX ADMIN — BUPROPION HYDROCHLORIDE 150 MILLIGRAM(S): 150 TABLET, EXTENDED RELEASE ORAL at 11:08

## 2020-03-14 RX ADMIN — BUDESONIDE AND FORMOTEROL FUMARATE DIHYDRATE 2 PUFF(S): 160; 4.5 AEROSOL RESPIRATORY (INHALATION) at 18:55

## 2020-03-14 RX ADMIN — TRAMADOL HYDROCHLORIDE 50 MILLIGRAM(S): 50 TABLET ORAL at 11:08

## 2020-03-14 RX ADMIN — Medication 3 MILLIGRAM(S): at 21:30

## 2020-03-14 RX ADMIN — GABAPENTIN 600 MILLIGRAM(S): 400 CAPSULE ORAL at 11:08

## 2020-03-14 RX ADMIN — Medication 6 MILLIGRAM(S): at 16:55

## 2020-03-14 NOTE — PROGRESS NOTE ADULT - ASSESSMENT
58 y/o M s/p L5-S1 fusion through anterior approach. Gen surg consulted intra op for anterior approach.     Plan:  - Continue current management  - consider repeat dose of steroids for R leg and hip pain as appropriate  - Management per primary team   - surgery team4c will continue to follow

## 2020-03-14 NOTE — PROGRESS NOTE ADULT - SUBJECTIVE AND OBJECTIVE BOX
POD: 4  Procedure: L5-S1 fusion through anterior approach    SUBJECTIVE: Patient seen and examined at bedside. Reports continued R leg and hip pain that was relieved with steroids last night. Susie diet, passing flatus, having bowel movements. Ambulating with difficulty due to pain.       Vital Signs Last 24 Hrs  T(C): 36.6 (14 Mar 2020 08:57), Max: 37 (13 Mar 2020 20:42)  T(F): 97.8 (14 Mar 2020 08:57), Max: 98.6 (13 Mar 2020 20:42)  HR: 94 (14 Mar 2020 08:57) (94 - 100)  BP: 146/71 (14 Mar 2020 08:57) (134/71 - 146/71)  BP(mean): --  RR: 15 (14 Mar 2020 08:57) (15 - 16)  SpO2: 93% (14 Mar 2020 08:57) (93% - 98%)    Physical Exam:  General: NAD  Pulmonary: Nonlabored breathing, no respiratory distress  Abdominal: soft, nondistended, nontender with no rebound or guarding, incision CDI, dressing on back CDI and RICARDO ss   Extremities: WWP, normal strength, no clubbing/cyanosis/edema  Neuro: A/O x3    Lines/drains/tubes:    I&O's Summary    13 Mar 2020 07:01  -  14 Mar 2020 07:00  --------------------------------------------------------  IN: 480 mL / OUT: 140 mL / NET: 340 mL        LABS:                        11.2   11.23 )-----------( 210      ( 14 Mar 2020 07:24 )             34.2     03-14    140  |  101  |  12  ----------------------------<  137<H>  4.3   |  28  |  0.74    Ca    8.7      14 Mar 2020 07:24          CAPILLARY BLOOD GLUCOSE            RADIOLOGY & ADDITIONAL STUDIES:

## 2020-03-14 NOTE — PROGRESS NOTE ADULT - SUBJECTIVE AND OBJECTIVE BOX
Ortho Note    Pt seen and examined at bedside this AM. Reports some pain but imprives with pain meds and decadron. Ambulating   Denies CP, SOB, N/V, numbness/tingling     Vital Signs Last 24 Hrs  T(C): 36.6 (03-14-20 @ 08:57), Max: 36.6 (03-14-20 @ 08:57)  T(F): 97.8 (03-14-20 @ 08:57), Max: 97.8 (03-14-20 @ 08:57)  HR: 94 (03-14-20 @ 08:57) (94 - 94)  BP: 146/71 (03-14-20 @ 08:57) (146/71 - 146/71)  BP(mean): --  RR: 15 (03-14-20 @ 08:57) (15 - 15)  SpO2: 93% (03-14-20 @ 08:57) (93% - 93%)    General: Pt Alert and oriented, NAD  Back DSG C/D/I  HV x1 in place holding suction  Sensation intact BL LE  Motor strength 5/5 BL LE  2+ DP/PT pulse   Toes WWP                          11.2   11.23 )-----------( 210      ( 14 Mar 2020 07:24 )             34.2   14 Mar 2020 07:24    140    |  101    |  12     ----------------------------<  137    4.3     |  28     |  0.74     Ca    8.7        14 Mar 2020 07:24    Drain output:    03-13-20 @ 07:01  -  03-14-20 @ 07:00  --------------------------------------------------------  OUT:    Accordian: 140 mL  Total OUT: 140 mL    A/P: 59yMale s/p ALIF/PSF L5-S1   - Stable  - Pain Control  - DVT ppx: SCDs  - PT, WBS: WBAt  - cont drain  - decadron   - dispo poss DC home tomorrow     Ortho Pager [phone]

## 2020-03-15 LAB
ANION GAP SERPL CALC-SCNC: 14 MMOL/L — SIGNIFICANT CHANGE UP (ref 5–17)
BUN SERPL-MCNC: 13 MG/DL — SIGNIFICANT CHANGE UP (ref 7–23)
CALCIUM SERPL-MCNC: 8.9 MG/DL — SIGNIFICANT CHANGE UP (ref 8.4–10.5)
CHLORIDE SERPL-SCNC: 101 MMOL/L — SIGNIFICANT CHANGE UP (ref 96–108)
CO2 SERPL-SCNC: 25 MMOL/L — SIGNIFICANT CHANGE UP (ref 22–31)
CREAT SERPL-MCNC: 0.75 MG/DL — SIGNIFICANT CHANGE UP (ref 0.5–1.3)
GLUCOSE SERPL-MCNC: 133 MG/DL — HIGH (ref 70–99)
HCT VFR BLD CALC: 35.8 % — LOW (ref 39–50)
HGB BLD-MCNC: 11.9 G/DL — LOW (ref 13–17)
MCHC RBC-ENTMCNC: 31.6 PG — SIGNIFICANT CHANGE UP (ref 27–34)
MCHC RBC-ENTMCNC: 33.2 GM/DL — SIGNIFICANT CHANGE UP (ref 32–36)
MCV RBC AUTO: 95 FL — SIGNIFICANT CHANGE UP (ref 80–100)
NRBC # BLD: 0 /100 WBCS — SIGNIFICANT CHANGE UP (ref 0–0)
PLATELET # BLD AUTO: 285 K/UL — SIGNIFICANT CHANGE UP (ref 150–400)
POTASSIUM SERPL-MCNC: 3.6 MMOL/L — SIGNIFICANT CHANGE UP (ref 3.5–5.3)
POTASSIUM SERPL-SCNC: 3.6 MMOL/L — SIGNIFICANT CHANGE UP (ref 3.5–5.3)
RBC # BLD: 3.77 M/UL — LOW (ref 4.2–5.8)
RBC # FLD: 12.5 % — SIGNIFICANT CHANGE UP (ref 10.3–14.5)
SODIUM SERPL-SCNC: 140 MMOL/L — SIGNIFICANT CHANGE UP (ref 135–145)
WBC # BLD: 12.2 K/UL — HIGH (ref 3.8–10.5)
WBC # FLD AUTO: 12.2 K/UL — HIGH (ref 3.8–10.5)

## 2020-03-15 RX ORDER — HYDROMORPHONE HYDROCHLORIDE 2 MG/ML
1 INJECTION INTRAMUSCULAR; INTRAVENOUS; SUBCUTANEOUS EVERY 4 HOURS
Refills: 0 | Status: DISCONTINUED | OUTPATIENT
Start: 2020-03-15 | End: 2020-03-19

## 2020-03-15 RX ORDER — DEXAMETHASONE 0.5 MG/5ML
4 ELIXIR ORAL EVERY 12 HOURS
Refills: 0 | Status: COMPLETED | OUTPATIENT
Start: 2020-03-15 | End: 2020-03-16

## 2020-03-15 RX ORDER — OXYCODONE HYDROCHLORIDE 5 MG/1
10 TABLET ORAL EVERY 12 HOURS
Refills: 0 | Status: DISCONTINUED | OUTPATIENT
Start: 2020-03-15 | End: 2020-03-19

## 2020-03-15 RX ADMIN — CYCLOBENZAPRINE HYDROCHLORIDE 10 MILLIGRAM(S): 10 TABLET, FILM COATED ORAL at 17:21

## 2020-03-15 RX ADMIN — HYDROMORPHONE HYDROCHLORIDE 1 MILLIGRAM(S): 2 INJECTION INTRAMUSCULAR; INTRAVENOUS; SUBCUTANEOUS at 15:36

## 2020-03-15 RX ADMIN — Medication 10 MILLIGRAM(S): at 17:21

## 2020-03-15 RX ADMIN — ATORVASTATIN CALCIUM 10 MILLIGRAM(S): 80 TABLET, FILM COATED ORAL at 21:37

## 2020-03-15 RX ADMIN — Medication 1 TABLET(S): at 11:40

## 2020-03-15 RX ADMIN — Medication 6 MILLIGRAM(S): at 05:14

## 2020-03-15 RX ADMIN — GABAPENTIN 600 MILLIGRAM(S): 400 CAPSULE ORAL at 21:37

## 2020-03-15 RX ADMIN — TRAMADOL HYDROCHLORIDE 50 MILLIGRAM(S): 50 TABLET ORAL at 12:40

## 2020-03-15 RX ADMIN — TRAMADOL HYDROCHLORIDE 50 MILLIGRAM(S): 50 TABLET ORAL at 11:40

## 2020-03-15 RX ADMIN — Medication 4 MILLIGRAM(S): at 17:21

## 2020-03-15 RX ADMIN — Medication 3 MILLIGRAM(S): at 21:38

## 2020-03-15 RX ADMIN — HYDROMORPHONE HYDROCHLORIDE 1 MILLIGRAM(S): 2 INJECTION INTRAMUSCULAR; INTRAVENOUS; SUBCUTANEOUS at 15:21

## 2020-03-15 RX ADMIN — OXYCODONE HYDROCHLORIDE 10 MILLIGRAM(S): 5 TABLET ORAL at 19:32

## 2020-03-15 RX ADMIN — GABAPENTIN 600 MILLIGRAM(S): 400 CAPSULE ORAL at 05:16

## 2020-03-15 RX ADMIN — Medication 10 MILLIGRAM(S): at 05:16

## 2020-03-15 RX ADMIN — CYCLOBENZAPRINE HYDROCHLORIDE 10 MILLIGRAM(S): 10 TABLET, FILM COATED ORAL at 09:00

## 2020-03-15 RX ADMIN — Medication 20 MILLIGRAM(S): at 05:16

## 2020-03-15 RX ADMIN — BUPROPION HYDROCHLORIDE 150 MILLIGRAM(S): 150 TABLET, EXTENDED RELEASE ORAL at 11:40

## 2020-03-15 RX ADMIN — GABAPENTIN 600 MILLIGRAM(S): 400 CAPSULE ORAL at 13:45

## 2020-03-15 RX ADMIN — OXYCODONE HYDROCHLORIDE 10 MILLIGRAM(S): 5 TABLET ORAL at 18:32

## 2020-03-15 RX ADMIN — PANTOPRAZOLE SODIUM 40 MILLIGRAM(S): 20 TABLET, DELAYED RELEASE ORAL at 05:16

## 2020-03-15 RX ADMIN — Medication 225 MILLIGRAM(S): at 11:40

## 2020-03-15 RX ADMIN — TRAMADOL HYDROCHLORIDE 50 MILLIGRAM(S): 50 TABLET ORAL at 02:23

## 2020-03-15 RX ADMIN — TRAMADOL HYDROCHLORIDE 50 MILLIGRAM(S): 50 TABLET ORAL at 03:00

## 2020-03-15 NOTE — PROGRESS NOTE ADULT - SUBJECTIVE AND OBJECTIVE BOX
Ortho Note    Pt seen and examined at bedside this AM, reports more RLE pain this AM. On decadron taper   Denies CP, SOB, N/V, numbness/tingling     Vital Signs Last 24 Hrs  T(C): 36.6 (03-15-20 @ 04:30), Max: 36.6 (03-15-20 @ 04:30)  T(F): 97.8 (03-15-20 @ 04:30), Max: 97.8 (03-15-20 @ 04:30)  HR: 82 (03-15-20 @ 04:30) (82 - 82)  BP: 149/83 (03-15-20 @ 04:30) (149/83 - 149/83)  BP(mean): --  RR: 16 (03-15-20 @ 04:30) (16 - 16)  SpO2: 96% (03-15-20 @ 04:30) (96% - 96%)  AVSS    General: Pt Alert and oriented, NAD  Back DSG C/D/I  HV x1 in place holding suction  Sensation intact BL LE  Motor strength 5/5 BL LE  2+ DP/PT pulse   Toes WWP                          11.9   12.20 )-----------( 285      ( 15 Mar 2020 06:55 )             35.8   15 Mar 2020 06:55    140    |  101    |  13     ----------------------------<  133    3.6     |  25     |  0.75     Ca    8.9        15 Mar 2020 06:55    Drain output:    03-14-20 @ 07:01  -  03-15-20 @ 07:00  --------------------------------------------------------  OUT:    Accordian: 73 mL  Total OUT: 73 mL    A/P: 59yMale s/p ALIF/PSF L5-S1   - Stable  - Pain Control  - DVT ppx: SCDs  - PT, WBS: WBAT  - DC drain  - decadron   - dispo home tomorrow     Ortho Pager [phone]

## 2020-03-15 NOTE — PROGRESS NOTE ADULT - ASSESSMENT
60 y/o M s/p L5-S1 fusion through anterior approach. Gen surg consulted intra op for anterior approach.     Plan:  - Continue current management  - Management per primary team   - surgery [email or web address] will continue to follow

## 2020-03-15 NOTE — PROGRESS NOTE ADULT - SUBJECTIVE AND OBJECTIVE BOX
STATUS POST:  L5-S1 ALIF/PSF     SUBJECTIVE: Patient seen and examined bedside. passing flatus, Having BM. tolerating diet. some right leg pain that he is recieving steroids for.     doxazosin 3 milliGRAM(s) Oral at bedtime      Vital Signs Last 24 Hrs  T(C): 36.7 (15 Mar 2020 08:20), Max: 36.9 (14 Mar 2020 20:10)  T(F): 98 (15 Mar 2020 08:20), Max: 98.4 (14 Mar 2020 20:10)  HR: 96 (15 Mar 2020 08:20) (82 - 96)  BP: 141/84 (15 Mar 2020 08:20) (107/61 - 150/70)  BP(mean): --  RR: 17 (15 Mar 2020 08:20) (16 - 17)  SpO2: 95% (15 Mar 2020 08:20) (95% - 96%)  I&O's Detail    14 Mar 2020 07:01  -  15 Mar 2020 07:00  --------------------------------------------------------  IN:  Total IN: 0 mL    OUT:    Accordian: 73 mL    Voided: 1000 mL  Total OUT: 1073 mL    Total NET: -1073 mL          General: NAD, resting comfortably in bed  C/V: NSR  Pulm: Nonlabored breathing, no respiratory distress  Abd: soft, NT/ND. incisions are clean, dry and intact   Extrem: WWP, no edema, SCDs in place        LABS:                        11.9   12.20 )-----------( 285      ( 15 Mar 2020 06:55 )             35.8     03-15    140  |  101  |  13  ----------------------------<  133<H>  3.6   |  25  |  0.75    Ca    8.9      15 Mar 2020 06:55            RADIOLOGY & ADDITIONAL STUDIES:

## 2020-03-16 LAB
ANION GAP SERPL CALC-SCNC: 13 MMOL/L — SIGNIFICANT CHANGE UP (ref 5–17)
BUN SERPL-MCNC: 11 MG/DL — SIGNIFICANT CHANGE UP (ref 7–23)
CALCIUM SERPL-MCNC: 8.6 MG/DL — SIGNIFICANT CHANGE UP (ref 8.4–10.5)
CHLORIDE SERPL-SCNC: 100 MMOL/L — SIGNIFICANT CHANGE UP (ref 96–108)
CO2 SERPL-SCNC: 24 MMOL/L — SIGNIFICANT CHANGE UP (ref 22–31)
CREAT SERPL-MCNC: 0.64 MG/DL — SIGNIFICANT CHANGE UP (ref 0.5–1.3)
GLUCOSE SERPL-MCNC: 196 MG/DL — HIGH (ref 70–99)
HCT VFR BLD CALC: 36 % — LOW (ref 39–50)
HGB BLD-MCNC: 11.5 G/DL — LOW (ref 13–17)
MCHC RBC-ENTMCNC: 31.4 PG — SIGNIFICANT CHANGE UP (ref 27–34)
MCHC RBC-ENTMCNC: 31.9 GM/DL — LOW (ref 32–36)
MCV RBC AUTO: 98.4 FL — SIGNIFICANT CHANGE UP (ref 80–100)
NRBC # BLD: 0 /100 WBCS — SIGNIFICANT CHANGE UP (ref 0–0)
PLATELET # BLD AUTO: 273 K/UL — SIGNIFICANT CHANGE UP (ref 150–400)
POTASSIUM SERPL-MCNC: 4 MMOL/L — SIGNIFICANT CHANGE UP (ref 3.5–5.3)
POTASSIUM SERPL-SCNC: 4 MMOL/L — SIGNIFICANT CHANGE UP (ref 3.5–5.3)
RBC # BLD: 3.66 M/UL — LOW (ref 4.2–5.8)
RBC # FLD: 12.8 % — SIGNIFICANT CHANGE UP (ref 10.3–14.5)
SODIUM SERPL-SCNC: 137 MMOL/L — SIGNIFICANT CHANGE UP (ref 135–145)
WBC # BLD: 12.47 K/UL — HIGH (ref 3.8–10.5)
WBC # FLD AUTO: 12.47 K/UL — HIGH (ref 3.8–10.5)

## 2020-03-16 RX ORDER — HYDROMORPHONE HYDROCHLORIDE 2 MG/ML
2 INJECTION INTRAMUSCULAR; INTRAVENOUS; SUBCUTANEOUS EVERY 4 HOURS
Refills: 0 | Status: DISCONTINUED | OUTPATIENT
Start: 2020-03-16 | End: 2020-03-19

## 2020-03-16 RX ORDER — DEXAMETHASONE 0.5 MG/5ML
4 ELIXIR ORAL EVERY 6 HOURS
Refills: 0 | Status: DISCONTINUED | OUTPATIENT
Start: 2020-03-16 | End: 2020-03-16

## 2020-03-16 RX ORDER — DEXAMETHASONE 0.5 MG/5ML
4 ELIXIR ORAL EVERY 6 HOURS
Refills: 0 | Status: DISCONTINUED | OUTPATIENT
Start: 2020-03-16 | End: 2020-03-17

## 2020-03-16 RX ORDER — HYDROMORPHONE HYDROCHLORIDE 2 MG/ML
4 INJECTION INTRAMUSCULAR; INTRAVENOUS; SUBCUTANEOUS EVERY 4 HOURS
Refills: 0 | Status: DISCONTINUED | OUTPATIENT
Start: 2020-03-16 | End: 2020-03-19

## 2020-03-16 RX ADMIN — Medication 4 MILLIGRAM(S): at 17:28

## 2020-03-16 RX ADMIN — OXYCODONE HYDROCHLORIDE 10 MILLIGRAM(S): 5 TABLET ORAL at 05:41

## 2020-03-16 RX ADMIN — ATORVASTATIN CALCIUM 10 MILLIGRAM(S): 80 TABLET, FILM COATED ORAL at 21:38

## 2020-03-16 RX ADMIN — SENNA PLUS 2 TABLET(S): 8.6 TABLET ORAL at 21:38

## 2020-03-16 RX ADMIN — GABAPENTIN 600 MILLIGRAM(S): 400 CAPSULE ORAL at 21:38

## 2020-03-16 RX ADMIN — Medication 1 TABLET(S): at 11:31

## 2020-03-16 RX ADMIN — Medication 225 MILLIGRAM(S): at 11:31

## 2020-03-16 RX ADMIN — CYCLOBENZAPRINE HYDROCHLORIDE 10 MILLIGRAM(S): 10 TABLET, FILM COATED ORAL at 17:28

## 2020-03-16 RX ADMIN — PANTOPRAZOLE SODIUM 40 MILLIGRAM(S): 20 TABLET, DELAYED RELEASE ORAL at 05:11

## 2020-03-16 RX ADMIN — Medication 20 MILLIGRAM(S): at 05:11

## 2020-03-16 RX ADMIN — CYCLOBENZAPRINE HYDROCHLORIDE 10 MILLIGRAM(S): 10 TABLET, FILM COATED ORAL at 09:18

## 2020-03-16 RX ADMIN — BUPROPION HYDROCHLORIDE 150 MILLIGRAM(S): 150 TABLET, EXTENDED RELEASE ORAL at 11:31

## 2020-03-16 RX ADMIN — Medication 10 MILLIGRAM(S): at 06:49

## 2020-03-16 RX ADMIN — OXYCODONE HYDROCHLORIDE 10 MILLIGRAM(S): 5 TABLET ORAL at 05:11

## 2020-03-16 RX ADMIN — HYDROMORPHONE HYDROCHLORIDE 2 MILLIGRAM(S): 2 INJECTION INTRAMUSCULAR; INTRAVENOUS; SUBCUTANEOUS at 22:38

## 2020-03-16 RX ADMIN — CYCLOBENZAPRINE HYDROCHLORIDE 10 MILLIGRAM(S): 10 TABLET, FILM COATED ORAL at 01:21

## 2020-03-16 RX ADMIN — Medication 4 MILLIGRAM(S): at 12:44

## 2020-03-16 RX ADMIN — HYDROMORPHONE HYDROCHLORIDE 2 MILLIGRAM(S): 2 INJECTION INTRAMUSCULAR; INTRAVENOUS; SUBCUTANEOUS at 21:38

## 2020-03-16 RX ADMIN — GABAPENTIN 600 MILLIGRAM(S): 400 CAPSULE ORAL at 05:11

## 2020-03-16 RX ADMIN — OXYCODONE HYDROCHLORIDE 10 MILLIGRAM(S): 5 TABLET ORAL at 18:23

## 2020-03-16 RX ADMIN — Medication 4 MILLIGRAM(S): at 06:49

## 2020-03-16 RX ADMIN — Medication 10 MILLIGRAM(S): at 17:28

## 2020-03-16 RX ADMIN — OXYCODONE HYDROCHLORIDE 10 MILLIGRAM(S): 5 TABLET ORAL at 19:24

## 2020-03-16 RX ADMIN — Medication 3 MILLIGRAM(S): at 21:38

## 2020-03-16 RX ADMIN — GABAPENTIN 600 MILLIGRAM(S): 400 CAPSULE ORAL at 13:47

## 2020-03-16 NOTE — PROGRESS NOTE ADULT - ASSESSMENT
This is a 58 y/o M s/p L5-S1 fusion through anterior approach   Gen surg consulted intra op for anterior approach   Doing well, abdomen soft, palpable DP pulses bilaterally   Having BMs, no N/V, no BM, tolerating mechanical diet   Main complaint is right leg pain     Plan:  - Continue current management    - Management per primary team   - surgery team4c will sign off

## 2020-03-16 NOTE — PROGRESS NOTE ADULT - SUBJECTIVE AND OBJECTIVE BOX
SUBJECTIVE:   Patient doing well from an abdominal standpoint   Having BMs, no abdominal pain   Persistent right leg pain       Vital Signs Last 24 Hrs  T(C): 36.6 (14 Mar 2020 08:57), Max: 37 (13 Mar 2020 20:42)  T(F): 97.8 (14 Mar 2020 08:57), Max: 98.6 (13 Mar 2020 20:42)  HR: 94 (14 Mar 2020 08:57) (94 - 100)  BP: 146/71 (14 Mar 2020 08:57) (134/71 - 146/71)  BP(mean): --  RR: 15 (14 Mar 2020 08:57) (15 - 16)  SpO2: 93% (14 Mar 2020 08:57) (93% - 98%)    Physical Exam:  General: NAD, resting comfortably in bed  HEENT: normocephalic, no scleral ictera   Neuro: alert and oriented, no focal deficit   C/V: regular rate and rhythm, no murmur to auscultation, no carotid bruit. Palpable DP pulses b/l    Pulm: Nonlabored breathing, lungs are clear to auscultation   Abd: abdomen soft, minimaly tender to palpation, no hepatomegaly   MSK: no swelling, no deformity  Skin: dresssing in place, no saturated, no surrounding erythema       I&O's Summary    13 Mar 2020 07:01  -  14 Mar 2020 07:00  --------------------------------------------------------  IN: 480 mL / OUT: 140 mL / NET: 340 mL        LABS:                        11.2   11.23 )-----------( 210      ( 14 Mar 2020 07:24 )             34.2     03-14    140  |  101  |  12  ----------------------------<  137<H>  4.3   |  28  |  0.74    Ca    8.7      14 Mar 2020 07:24          CAPILLARY BLOOD GLUCOSE            RADIOLOGY & ADDITIONAL STUDIES:

## 2020-03-16 NOTE — PROGRESS NOTE ADULT - SUBJECTIVE AND OBJECTIVE BOX
Ortho Note    Pt seen and examined at bedside this AM, reports continued RLE pain this AM. Unable to ambulate secondary to pain per patient   Denies CP, SOB, N/V, numbness/tingling     Vital Signs Last 24 Hrs  T(C): 36.5 (16 Mar 2020 08:10), Max: 36.5 (16 Mar 2020 04:30)  T(F): 97.7 (16 Mar 2020 08:10), Max: 97.7 (16 Mar 2020 04:30)  HR: 93 (16 Mar 2020 08:10) (85 - 96)  BP: 150/84 (16 Mar 2020 08:10) (135/70 - 160/88)  BP(mean): --  RR: 17 (16 Mar 2020 08:10) (16 - 17)  SpO2: 95% (16 Mar 2020 08:10) (95% - 96%)    General: Pt Alert and oriented, NAD  Back DSG C/D/I  Sensation intact BL LE  Motor strength 5/5 BL LE  2+ DP/PT pulse   Toes WWP                          11.5   12.47 )-----------( 273      ( 16 Mar 2020 10:48 )             36.0         A/P: 59yMale s/p ALIF/PSF L5-S1   - Stable  - Pain Control  - DVT ppx: SCDs  - PT, WBS: WBAT  - decadron 4mg q6 today, q mg q12 tomorrow  - dispo pending pain control    Ortho Pager [phone]

## 2020-03-16 NOTE — PROGRESS NOTE ADULT - SUBJECTIVE AND OBJECTIVE BOX
Ortho Note    Pt complaining of continued LBP radiating down his right glute.  Pt states he has been having regular bowel movements and passing gas.  Pt states he has been tolerating a regular diet well.  Denies CP, SOB, N/V, numbness/tingling down le b/l and symmetrically.    Vital Signs Last 24 Hrs  T(C): 36.5 (03-16-20 @ 08:10), Max: 36.5 (03-16-20 @ 08:10)  T(F): 97.7 (03-16-20 @ 08:10), Max: 97.7 (03-16-20 @ 08:10)  HR: 93 (03-16-20 @ 08:10) (93 - 93)  BP: 150/84 (03-16-20 @ 08:10) (150/84 - 150/84)  BP(mean): --  RR: 17 (03-16-20 @ 08:10) (17 - 17)  SpO2: 95% (03-16-20 @ 08:10) (95% - 95%)      General: Pt Alert and oriented, NAD  DSG gauze teg C/D/I posterior spine incision  Pulses:  DPs palpable b/l le  Sensation:  SILT throughout le b/l and symmetrically   Motor: EHL/FHL/TA/GS  5/5 b/l     Abdomen:  Gauze teg c/d/i, OnQ pump in place  abdomen soft nttp b/l                           11.5   12.47 )-----------( 273      ( 16 Mar 2020 10:48 )             36.0   16 Mar 2020 10:48    137    |  100    |  11     ----------------------------<  196    4.0     |  24     |  0.64     Ca    8.6        16 Mar 2020 10:48        A/P: 59yMale POD#5 s/p L5-S1 ALIF/PSF  - Stable  - Pain Control as needed  - DVT ppx:  scds  - Strict bedrest at this time  - Will begin IV decadron for pain control   - Trend labs  - will remove OnQ pump today   - Dispo Home with HPT when clearing PT eval    Ortho Pager [phone]

## 2020-03-17 LAB
ANION GAP SERPL CALC-SCNC: 11 MMOL/L — SIGNIFICANT CHANGE UP (ref 5–17)
BUN SERPL-MCNC: 12 MG/DL — SIGNIFICANT CHANGE UP (ref 7–23)
CALCIUM SERPL-MCNC: 8.9 MG/DL — SIGNIFICANT CHANGE UP (ref 8.4–10.5)
CHLORIDE SERPL-SCNC: 102 MMOL/L — SIGNIFICANT CHANGE UP (ref 96–108)
CO2 SERPL-SCNC: 27 MMOL/L — SIGNIFICANT CHANGE UP (ref 22–31)
CREAT SERPL-MCNC: 0.63 MG/DL — SIGNIFICANT CHANGE UP (ref 0.5–1.3)
GLUCOSE SERPL-MCNC: 139 MG/DL — HIGH (ref 70–99)
HCT VFR BLD CALC: 36.9 % — LOW (ref 39–50)
HGB BLD-MCNC: 11.9 G/DL — LOW (ref 13–17)
MCHC RBC-ENTMCNC: 31.6 PG — SIGNIFICANT CHANGE UP (ref 27–34)
MCHC RBC-ENTMCNC: 32.2 GM/DL — SIGNIFICANT CHANGE UP (ref 32–36)
MCV RBC AUTO: 98.1 FL — SIGNIFICANT CHANGE UP (ref 80–100)
NRBC # BLD: 0 /100 WBCS — SIGNIFICANT CHANGE UP (ref 0–0)
PLATELET # BLD AUTO: 310 K/UL — SIGNIFICANT CHANGE UP (ref 150–400)
POTASSIUM SERPL-MCNC: 4.3 MMOL/L — SIGNIFICANT CHANGE UP (ref 3.5–5.3)
POTASSIUM SERPL-SCNC: 4.3 MMOL/L — SIGNIFICANT CHANGE UP (ref 3.5–5.3)
RBC # BLD: 3.76 M/UL — LOW (ref 4.2–5.8)
RBC # FLD: 12.9 % — SIGNIFICANT CHANGE UP (ref 10.3–14.5)
SODIUM SERPL-SCNC: 140 MMOL/L — SIGNIFICANT CHANGE UP (ref 135–145)
WBC # BLD: 14.19 K/UL — HIGH (ref 3.8–10.5)
WBC # FLD AUTO: 14.19 K/UL — HIGH (ref 3.8–10.5)

## 2020-03-17 RX ORDER — DEXAMETHASONE 0.5 MG/5ML
4 ELIXIR ORAL EVERY 12 HOURS
Refills: 0 | Status: COMPLETED | OUTPATIENT
Start: 2020-03-17 | End: 2020-03-18

## 2020-03-17 RX ORDER — METHYLPHENIDATE HCL 5 MG
10 TABLET ORAL
Refills: 0 | Status: DISCONTINUED | OUTPATIENT
Start: 2020-03-17 | End: 2020-03-19

## 2020-03-17 RX ADMIN — GABAPENTIN 600 MILLIGRAM(S): 400 CAPSULE ORAL at 05:41

## 2020-03-17 RX ADMIN — Medication 4 MILLIGRAM(S): at 06:42

## 2020-03-17 RX ADMIN — OXYCODONE HYDROCHLORIDE 10 MILLIGRAM(S): 5 TABLET ORAL at 17:36

## 2020-03-17 RX ADMIN — Medication 20 MILLIGRAM(S): at 05:41

## 2020-03-17 RX ADMIN — CYCLOBENZAPRINE HYDROCHLORIDE 10 MILLIGRAM(S): 10 TABLET, FILM COATED ORAL at 01:29

## 2020-03-17 RX ADMIN — SENNA PLUS 2 TABLET(S): 8.6 TABLET ORAL at 20:59

## 2020-03-17 RX ADMIN — Medication 10 MILLIGRAM(S): at 17:36

## 2020-03-17 RX ADMIN — BUDESONIDE AND FORMOTEROL FUMARATE DIHYDRATE 2 PUFF(S): 160; 4.5 AEROSOL RESPIRATORY (INHALATION) at 05:41

## 2020-03-17 RX ADMIN — GABAPENTIN 600 MILLIGRAM(S): 400 CAPSULE ORAL at 20:59

## 2020-03-17 RX ADMIN — Medication 3 MILLIGRAM(S): at 20:59

## 2020-03-17 RX ADMIN — OXYCODONE HYDROCHLORIDE 10 MILLIGRAM(S): 5 TABLET ORAL at 05:41

## 2020-03-17 RX ADMIN — BUPROPION HYDROCHLORIDE 150 MILLIGRAM(S): 150 TABLET, EXTENDED RELEASE ORAL at 11:29

## 2020-03-17 RX ADMIN — OXYCODONE HYDROCHLORIDE 10 MILLIGRAM(S): 5 TABLET ORAL at 06:41

## 2020-03-17 RX ADMIN — Medication 1 TABLET(S): at 11:29

## 2020-03-17 RX ADMIN — CYCLOBENZAPRINE HYDROCHLORIDE 10 MILLIGRAM(S): 10 TABLET, FILM COATED ORAL at 20:59

## 2020-03-17 RX ADMIN — ATORVASTATIN CALCIUM 10 MILLIGRAM(S): 80 TABLET, FILM COATED ORAL at 20:59

## 2020-03-17 RX ADMIN — GABAPENTIN 600 MILLIGRAM(S): 400 CAPSULE ORAL at 13:08

## 2020-03-17 RX ADMIN — Medication 225 MILLIGRAM(S): at 11:29

## 2020-03-17 RX ADMIN — PANTOPRAZOLE SODIUM 40 MILLIGRAM(S): 20 TABLET, DELAYED RELEASE ORAL at 05:41

## 2020-03-17 RX ADMIN — OXYCODONE HYDROCHLORIDE 10 MILLIGRAM(S): 5 TABLET ORAL at 18:36

## 2020-03-17 RX ADMIN — Medication 4 MILLIGRAM(S): at 17:35

## 2020-03-17 RX ADMIN — Medication 10 MILLIGRAM(S): at 06:43

## 2020-03-17 RX ADMIN — CYCLOBENZAPRINE HYDROCHLORIDE 10 MILLIGRAM(S): 10 TABLET, FILM COATED ORAL at 11:29

## 2020-03-17 RX ADMIN — Medication 4 MILLIGRAM(S): at 01:29

## 2020-03-17 NOTE — PROGRESS NOTE ADULT - SUBJECTIVE AND OBJECTIVE BOX
POST OPERATIVE DAY # 7 L5-S1 ALIF/PSF   SUBJECTIVE: Patient seen and examined.  Pt continues to have right thigh and gluteal pain, radiating from hip to knee. Pain is 9.5 / 10 today he states. Pt is anxious to be able to get up.  Denies chest pain/SOB/dizziness/n/v/HA   Pain well controlled.       OBJECTIVE:     Vital Signs Last 24 Hrs  T(C): 36.3 (17 Mar 2020 08:20), Max: 36.4 (16 Mar 2020 16:21)  T(F): 97.4 (17 Mar 2020 08:20), Max: 97.6 (16 Mar 2020 21:01)  HR: 83 (17 Mar 2020 08:20) (82 - 94)  BP: 136/76 (17 Mar 2020 08:20) (130/72 - 156/70)  BP(mean): --  RR: 17 (17 Mar 2020 08:20) (16 - 17)  SpO2: 95% (17 Mar 2020 08:20) (94% - 95%)    PE:          Dressing: clean/dry/intact anterior and posterior.          Sensation: intact to light touch to patient's baseline BLE         Motor exam:  firing ehl/ta/gs/fhl 5/5          Skin warm, well-perfused; capillary refill brisk BLE              LABS:                        11.9   14.19 )-----------( 310      ( 17 Mar 2020 07:20 )             36.9     03-17    140  |  102  |  12  ----------------------------<  139<H>  4.3   |  27  |  0.63    Ca    8.9      17 Mar 2020 07:20      ASSESSMENT AND PLAN: POD 7 ALIF PSF L5-S1   1. Stable. Bedrest for now, monitoring symptoms for improvement. Decadron 4mg q12hr today.   2. Analgesic pain control  3. DVT prophylaxis: SCDs  4. Weight Bearing Status:  WBAT but bedrest currently   5. Disposition: acute rehab if accepted/once bedrest lifted

## 2020-03-17 NOTE — PROGRESS NOTE ADULT - SUBJECTIVE AND OBJECTIVE BOX
Ortho Note    Pt seen and examined at bedside this AM, reports continued RLE pain this AM, although slightly improving. Unable to ambulate secondary to pain per patient   Denies CP, SOB, N/V, numbness/tingling     Vital Signs Last 24 Hrs  T(C): 36.3 (17 Mar 2020 08:20), Max: 36.4 (16 Mar 2020 16:21)  T(F): 97.4 (17 Mar 2020 08:20), Max: 97.6 (16 Mar 2020 21:01)  HR: 83 (17 Mar 2020 08:20) (82 - 94)  BP: 136/76 (17 Mar 2020 08:20) (130/72 - 156/70)  BP(mean): --  RR: 17 (17 Mar 2020 08:20) (16 - 17)  SpO2: 95% (17 Mar 2020 08:20) (94% - 95%)    General: Pt Alert and oriented, NAD  Back DSG C/D/I  Sensation intact BL LE  Motor strength 5/5 BL LE  2+ DP/PT pulse   Toes WWP                                     11.9   14.19 )-----------( 310      ( 17 Mar 2020 07:20 )             36.9         A/P: 59yMale s/p ALIF/PSF L5-S1   - Stable  - Pain Control  - DVT ppx: SCDs  - PT, WBS: WBAT  - decadron 4mg q12 today  - dispo pending pain control, for Acute Rehab     Ortho Pager [phone]

## 2020-03-18 RX ORDER — HYDROMORPHONE HYDROCHLORIDE 2 MG/ML
1 INJECTION INTRAMUSCULAR; INTRAVENOUS; SUBCUTANEOUS
Qty: 42 | Refills: 0
Start: 2020-03-18 | End: 2020-03-24

## 2020-03-18 RX ORDER — ACETAMINOPHEN 500 MG
2 TABLET ORAL
Qty: 0 | Refills: 0 | DISCHARGE
Start: 2020-03-18

## 2020-03-18 RX ORDER — DEXAMETHASONE 0.5 MG/5ML
4 ELIXIR ORAL EVERY 12 HOURS
Refills: 0 | Status: COMPLETED | OUTPATIENT
Start: 2020-03-18 | End: 2020-03-19

## 2020-03-18 RX ADMIN — BUPROPION HYDROCHLORIDE 150 MILLIGRAM(S): 150 TABLET, EXTENDED RELEASE ORAL at 11:32

## 2020-03-18 RX ADMIN — HYDROMORPHONE HYDROCHLORIDE 2 MILLIGRAM(S): 2 INJECTION INTRAMUSCULAR; INTRAVENOUS; SUBCUTANEOUS at 08:07

## 2020-03-18 RX ADMIN — Medication 10 MILLIGRAM(S): at 07:02

## 2020-03-18 RX ADMIN — Medication 4 MILLIGRAM(S): at 17:17

## 2020-03-18 RX ADMIN — OXYCODONE HYDROCHLORIDE 10 MILLIGRAM(S): 5 TABLET ORAL at 05:15

## 2020-03-18 RX ADMIN — Medication 20 MILLIGRAM(S): at 05:15

## 2020-03-18 RX ADMIN — HYDROMORPHONE HYDROCHLORIDE 2 MILLIGRAM(S): 2 INJECTION INTRAMUSCULAR; INTRAVENOUS; SUBCUTANEOUS at 02:03

## 2020-03-18 RX ADMIN — PANTOPRAZOLE SODIUM 40 MILLIGRAM(S): 20 TABLET, DELAYED RELEASE ORAL at 05:15

## 2020-03-18 RX ADMIN — Medication 4 MILLIGRAM(S): at 05:15

## 2020-03-18 RX ADMIN — Medication 225 MILLIGRAM(S): at 11:32

## 2020-03-18 RX ADMIN — OXYCODONE HYDROCHLORIDE 10 MILLIGRAM(S): 5 TABLET ORAL at 06:15

## 2020-03-18 RX ADMIN — GABAPENTIN 600 MILLIGRAM(S): 400 CAPSULE ORAL at 21:43

## 2020-03-18 RX ADMIN — SENNA PLUS 2 TABLET(S): 8.6 TABLET ORAL at 21:43

## 2020-03-18 RX ADMIN — Medication 10 MILLIGRAM(S): at 17:17

## 2020-03-18 RX ADMIN — CYCLOBENZAPRINE HYDROCHLORIDE 10 MILLIGRAM(S): 10 TABLET, FILM COATED ORAL at 21:43

## 2020-03-18 RX ADMIN — OXYCODONE HYDROCHLORIDE 10 MILLIGRAM(S): 5 TABLET ORAL at 17:17

## 2020-03-18 RX ADMIN — ATORVASTATIN CALCIUM 10 MILLIGRAM(S): 80 TABLET, FILM COATED ORAL at 21:43

## 2020-03-18 RX ADMIN — Medication 3 MILLIGRAM(S): at 21:43

## 2020-03-18 RX ADMIN — CYCLOBENZAPRINE HYDROCHLORIDE 10 MILLIGRAM(S): 10 TABLET, FILM COATED ORAL at 11:32

## 2020-03-18 RX ADMIN — GABAPENTIN 600 MILLIGRAM(S): 400 CAPSULE ORAL at 05:15

## 2020-03-18 RX ADMIN — HYDROMORPHONE HYDROCHLORIDE 2 MILLIGRAM(S): 2 INJECTION INTRAMUSCULAR; INTRAVENOUS; SUBCUTANEOUS at 01:03

## 2020-03-18 RX ADMIN — HYDROMORPHONE HYDROCHLORIDE 2 MILLIGRAM(S): 2 INJECTION INTRAMUSCULAR; INTRAVENOUS; SUBCUTANEOUS at 08:37

## 2020-03-18 RX ADMIN — Medication 1 TABLET(S): at 11:32

## 2020-03-18 NOTE — CHART NOTE - NSCHARTNOTEFT_GEN_A_CORE
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  Spinal stenosis  Systemic lupus erythematosus: Lupus (systemic lupus erythematosus)  Chronic obstructive pulmonary disease: COPD (chronic obstructive pulmonary disease)  S/P hernia repair: inguinal  Surgery, elective: ankle surgery, right  Surgery, elective: sppinal cord stimulator implanted  Surgery, elective: cervical spine fusion  Surgery, elective: spinal fusion, T9-L5    Current Nutrition Order: DASH/TLC diet    PO Intake: Good (%) [ X ]  Fair (50-75%) [   ] Poor (<25%) [   ]    GI Issues: no N/V, last BM 3/16 (pt states he typically has BM every other day, does not take stool softeners PTA)    Pain: pain controlled at this time    Skin Integrity: Malachi score 18    Labs:   03-17    140  |  102  |  12  ----------------------------<  139<H>  4.3   |  27  |  0.63    Ca    8.9      17 Mar 2020 07:20    CAPILLARY BLOOD GLUCOSE    Medications:  MEDICATIONS  (STANDING):  atorvastatin 10 milliGRAM(s) Oral at bedtime  budesonide 160 MICROgram(s)/formoterol 4.5 MICROgram(s) Inhaler 2 Puff(s) Inhalation two times a day  BUpivacaine 0.25% On-Q Pump 400 milliLiter(s) (4 mL/Hr) IntraDermal. <Continuous>  BUpivacaine liposome 1.3% Injectable (no eMAR) 20 milliLiter(s) Local Injection once  buPROPion XL . 150 milliGRAM(s) Oral daily  dexAMETHasone     Tablet 4 milliGRAM(s) Oral every 12 hours  doxazosin 3 milliGRAM(s) Oral at bedtime  gabapentin 600 milliGRAM(s) Oral three times a day  lactated ringers. 1000 milliLiter(s) (145 mL/Hr) IV Continuous <Continuous>  methylphenidate 10 milliGRAM(s) Oral two times a day  multivitamin 1 Tablet(s) Oral daily  oxyCODONE  ER Tablet 10 milliGRAM(s) Oral every 12 hours  pantoprazole    Tablet 40 milliGRAM(s) Oral before breakfast  predniSONE   Tablet 20 milliGRAM(s) Oral daily  senna 2 Tablet(s) Oral at bedtime  venlafaxine XR. 225 milliGRAM(s) Oral daily    MEDICATIONS  (PRN):  acetaminophen   Tablet .. 650 milliGRAM(s) Oral every 6 hours PRN Temp greater or equal to 38C (100.4F), Mild Pain (1 - 3)  cyclobenzaprine 10 milliGRAM(s) Oral every 8 hours PRN Muscle Spasm  diazepam    Tablet 5 milliGRAM(s) Oral every 8 hours PRN muscle spasms  HYDROmorphone   Tablet 2 milliGRAM(s) Oral every 4 hours PRN Moderate Pain (4 - 6)  HYDROmorphone   Tablet 4 milliGRAM(s) Oral every 4 hours PRN Severe Pain (7 - 10)  HYDROmorphone  Injectable 1 milliGRAM(s) IV Push every 4 hours PRN Breakthrough pain  magnesium hydroxide Suspension 30 milliLiter(s) Oral every 12 hours PRN Constipation    Weight:  117.7kg (3/10)    Weight Change: No further weights for assessment. Please obtain current weight and trend weekly weights for further assessment.    Nutrition Focused Physical Exam: Completed [   ]  Not Pertinent [ X  ]    Estimated energy needs:  Ht (3/10): 188cm, Wt (3/10): 117.7kg, IBW: 86.4kg+/-10%, %IBW: 136%, BMI: 33.3   IBW used to calculate energy needs due to pt's current body weight exceeding 120% of IBW. Needs adjusted for post-op, overweight status. Aim for higher end of kcal/protein range.    9160-1938 kcal (25-30 kcal/kg)  104-121 gm protein (1.2-1.4gm/kg)  1296-2531 mL (25-30 mL/kg)    Subjective: 59yMale hx COPD, depression/anxiety, PTSD, GERD, HTN, HLD, systemic lupus erythematosus, now s/p L5-S1 ALIF/PSF on 3/10. Pt resting in bed on assessment. Pt states appetite good now and PTA, consuming >75% of meals at this time. Pt limits sugar and salt PTA, NKFA. Pt states UBW ~250lb, denies weight changes PTA. RD to monitor and f/u per protocol.    Previous Nutrition Diagnosis:  Increased Nutrient Needs (protein) RT increased demand for nutrient AEB post-op  Active [ X  ]  Resolved [   ]    Goal: Pt to meet >/=75%EER PO with good tolerance    Recommendations:  1. Recommend continue DASH/TLC diet  2. Monitor labs closely   3. Obtain current weight and trend bi-weekly weights    Education: DASH/TLC diet, increase lean protein post-op; pt appeared receptive    Risk Level: High [   ] Moderate [ X ]  Low [   ]

## 2020-03-19 ENCOUNTER — TRANSCRIPTION ENCOUNTER (OUTPATIENT)
Age: 60
End: 2020-03-19

## 2020-03-19 VITALS
SYSTOLIC BLOOD PRESSURE: 145 MMHG | DIASTOLIC BLOOD PRESSURE: 84 MMHG | HEART RATE: 82 BPM | RESPIRATION RATE: 17 BRPM | OXYGEN SATURATION: 96 % | TEMPERATURE: 98 F

## 2020-03-19 RX ORDER — CYCLOBENZAPRINE HYDROCHLORIDE 10 MG/1
1 TABLET, FILM COATED ORAL
Qty: 30 | Refills: 0
Start: 2020-03-19

## 2020-03-19 RX ORDER — MORPHINE SULFATE 50 MG/1
1 CAPSULE, EXTENDED RELEASE ORAL
Qty: 21 | Refills: 0
Start: 2020-03-19 | End: 2020-03-25

## 2020-03-19 RX ADMIN — Medication 4 MILLIGRAM(S): at 06:31

## 2020-03-19 RX ADMIN — PANTOPRAZOLE SODIUM 40 MILLIGRAM(S): 20 TABLET, DELAYED RELEASE ORAL at 06:31

## 2020-03-19 RX ADMIN — BUPROPION HYDROCHLORIDE 150 MILLIGRAM(S): 150 TABLET, EXTENDED RELEASE ORAL at 12:27

## 2020-03-19 RX ADMIN — HYDROMORPHONE HYDROCHLORIDE 4 MILLIGRAM(S): 2 INJECTION INTRAMUSCULAR; INTRAVENOUS; SUBCUTANEOUS at 14:25

## 2020-03-19 RX ADMIN — Medication 10 MILLIGRAM(S): at 07:38

## 2020-03-19 RX ADMIN — GABAPENTIN 600 MILLIGRAM(S): 400 CAPSULE ORAL at 06:31

## 2020-03-19 RX ADMIN — Medication 1 TABLET(S): at 12:27

## 2020-03-19 RX ADMIN — OXYCODONE HYDROCHLORIDE 10 MILLIGRAM(S): 5 TABLET ORAL at 07:31

## 2020-03-19 RX ADMIN — OXYCODONE HYDROCHLORIDE 10 MILLIGRAM(S): 5 TABLET ORAL at 06:31

## 2020-03-19 RX ADMIN — HYDROMORPHONE HYDROCHLORIDE 4 MILLIGRAM(S): 2 INJECTION INTRAMUSCULAR; INTRAVENOUS; SUBCUTANEOUS at 15:23

## 2020-03-19 RX ADMIN — Medication 20 MILLIGRAM(S): at 06:31

## 2020-03-19 RX ADMIN — Medication 225 MILLIGRAM(S): at 12:27

## 2020-03-19 NOTE — PROGRESS NOTE ADULT - SUBJECTIVE AND OBJECTIVE BOX
Ortho Note    Pt seen and examined at bedside this AM, reports much improved RLE pain this AM, able to tolerate walking yesterday   Denies CP, SOB, N/V, numbness/tingling     Vital Signs Last 24 Hrs  T(C): 36.6 (19 Mar 2020 09:22), Max: 36.8 (18 Mar 2020 16:58)  T(F): 97.8 (19 Mar 2020 09:22), Max: 98.3 (18 Mar 2020 16:58)  HR: 82 (19 Mar 2020 09:22) (77 - 93)  BP: 145/84 (19 Mar 2020 09:22) (124/73 - 145/84)  BP(mean): --  RR: 17 (19 Mar 2020 09:22) (15 - 17)  SpO2: 96% (19 Mar 2020 09:22) (94% - 96%)    General: Pt Alert and oriented, NAD  Back DSG C/D/I  Sensation intact BL LE  Motor strength 5/5 BL LE  2+ DP/PT pulse   Toes WWP        A/P: 59yMale s/p ALIF/PSF L5-S1   - Stable  - Pain Control  - DVT ppx: SCDs  - PT, WBS: WBAT    - dispo home today    Ortho Pager [phone]

## 2020-03-19 NOTE — DISCHARGE NOTE NURSING/CASE MANAGEMENT/SOCIAL WORK - PATIENT PORTAL LINK FT
You can access the FollowMyHealth Patient Portal offered by [facility] by registering at the following website: [email or web address]. By joining [employer]’s FollowMyHealth portal, you will also be able to view your health information using other applications (apps) compatible with our system.

## 2020-03-19 NOTE — PROGRESS NOTE ADULT - REASON FOR ADMISSION
Lumbar spine pain

## 2020-03-24 ENCOUNTER — TRANSCRIPTION ENCOUNTER (OUTPATIENT)
Age: 60
End: 2020-03-24

## 2020-03-25 PROCEDURE — 86850 RBC ANTIBODY SCREEN: CPT

## 2020-03-25 PROCEDURE — 80048 BASIC METABOLIC PNL TOTAL CA: CPT

## 2020-03-25 PROCEDURE — 84295 ASSAY OF SERUM SODIUM: CPT

## 2020-03-25 PROCEDURE — 82330 ASSAY OF CALCIUM: CPT

## 2020-03-25 PROCEDURE — 97530 THERAPEUTIC ACTIVITIES: CPT

## 2020-03-25 PROCEDURE — 72131 CT LUMBAR SPINE W/O DYE: CPT

## 2020-03-25 PROCEDURE — 86901 BLOOD TYPING SEROLOGIC RH(D): CPT

## 2020-03-25 PROCEDURE — 94640 AIRWAY INHALATION TREATMENT: CPT

## 2020-03-25 PROCEDURE — 97161 PT EVAL LOW COMPLEX 20 MIN: CPT

## 2020-03-25 PROCEDURE — C1713: CPT

## 2020-03-25 PROCEDURE — 72100 X-RAY EXAM L-S SPINE 2/3 VWS: CPT

## 2020-03-25 PROCEDURE — C9399: CPT

## 2020-03-25 PROCEDURE — 85018 HEMOGLOBIN: CPT

## 2020-03-25 PROCEDURE — 36415 COLL VENOUS BLD VENIPUNCTURE: CPT

## 2020-03-25 PROCEDURE — 97116 GAIT TRAINING THERAPY: CPT

## 2020-03-25 PROCEDURE — 76000 FLUOROSCOPY <1 HR PHYS/QHP: CPT

## 2020-03-25 PROCEDURE — 85027 COMPLETE CBC AUTOMATED: CPT

## 2020-03-25 PROCEDURE — C1889: CPT

## 2020-03-25 PROCEDURE — C1769: CPT

## 2020-03-25 PROCEDURE — 85025 COMPLETE CBC W/AUTO DIFF WBC: CPT

## 2020-03-25 PROCEDURE — 84132 ASSAY OF SERUM POTASSIUM: CPT

## 2020-03-25 PROCEDURE — 72192 CT PELVIS W/O DYE: CPT

## 2020-03-25 PROCEDURE — 95940 IONM IN OPERATNG ROOM 15 MIN: CPT

## 2020-03-25 PROCEDURE — 82962 GLUCOSE BLOOD TEST: CPT

## 2020-03-31 DIAGNOSIS — M48.061 SPINAL STENOSIS, LUMBAR REGION WITHOUT NEUROGENIC CLAUDICATION: ICD-10-CM

## 2020-03-31 DIAGNOSIS — F32.9 MAJOR DEPRESSIVE DISORDER, SINGLE EPISODE, UNSPECIFIED: ICD-10-CM

## 2020-03-31 DIAGNOSIS — Z98.1 ARTHRODESIS STATUS: ICD-10-CM

## 2020-03-31 DIAGNOSIS — I10 ESSENTIAL (PRIMARY) HYPERTENSION: ICD-10-CM

## 2020-03-31 DIAGNOSIS — M32.9 SYSTEMIC LUPUS ERYTHEMATOSUS, UNSPECIFIED: ICD-10-CM

## 2020-03-31 DIAGNOSIS — F41.9 ANXIETY DISORDER, UNSPECIFIED: ICD-10-CM

## 2020-03-31 DIAGNOSIS — E66.9 OBESITY, UNSPECIFIED: ICD-10-CM

## 2020-03-31 DIAGNOSIS — Z96.82 PRESENCE OF NEUROSTIMULATOR: ICD-10-CM

## 2020-03-31 DIAGNOSIS — F43.10 POST-TRAUMATIC STRESS DISORDER, UNSPECIFIED: ICD-10-CM

## 2020-03-31 DIAGNOSIS — E78.5 HYPERLIPIDEMIA, UNSPECIFIED: ICD-10-CM

## 2020-03-31 DIAGNOSIS — M51.37 OTHER INTERVERTEBRAL DISC DEGENERATION, LUMBOSACRAL REGION: ICD-10-CM

## 2020-03-31 DIAGNOSIS — Z79.52 LONG TERM (CURRENT) USE OF SYSTEMIC STEROIDS: ICD-10-CM

## 2020-03-31 DIAGNOSIS — K21.9 GASTRO-ESOPHAGEAL REFLUX DISEASE WITHOUT ESOPHAGITIS: ICD-10-CM

## 2020-03-31 DIAGNOSIS — J44.9 CHRONIC OBSTRUCTIVE PULMONARY DISEASE, UNSPECIFIED: ICD-10-CM

## 2021-01-12 PROBLEM — M54.5 LOW BACK PAIN: Chronic | Status: ACTIVE | Noted: 2020-03-09

## 2021-01-12 PROBLEM — I10 ESSENTIAL (PRIMARY) HYPERTENSION: Chronic | Status: ACTIVE | Noted: 2020-03-09

## 2021-01-12 PROBLEM — K21.9 GASTRO-ESOPHAGEAL REFLUX DISEASE WITHOUT ESOPHAGITIS: Chronic | Status: ACTIVE | Noted: 2020-03-09

## 2021-01-12 PROBLEM — E78.5 HYPERLIPIDEMIA, UNSPECIFIED: Chronic | Status: ACTIVE | Noted: 2020-03-09

## 2021-01-12 PROBLEM — F32.9 MAJOR DEPRESSIVE DISORDER, SINGLE EPISODE, UNSPECIFIED: Chronic | Status: ACTIVE | Noted: 2020-03-09

## 2021-01-12 PROBLEM — M48.00 SPINAL STENOSIS, SITE UNSPECIFIED: Chronic | Status: ACTIVE | Noted: 2020-03-09

## 2021-01-15 ENCOUNTER — APPOINTMENT (OUTPATIENT)
Dept: ORTHOPEDIC SURGERY | Facility: CLINIC | Age: 61
End: 2021-01-15
Payer: MEDICARE

## 2021-01-15 VITALS — WEIGHT: 260 LBS | BODY MASS INDEX: 33.37 KG/M2 | HEIGHT: 74 IN

## 2021-01-15 VITALS — TEMPERATURE: 98 F

## 2021-01-15 DIAGNOSIS — Z86.79 PERSONAL HISTORY OF OTHER DISEASES OF THE CIRCULATORY SYSTEM: ICD-10-CM

## 2021-01-15 DIAGNOSIS — M32.9 SYSTEMIC LUPUS ERYTHEMATOSUS, UNSPECIFIED: ICD-10-CM

## 2021-01-15 DIAGNOSIS — M25.562 PAIN IN LEFT KNEE: ICD-10-CM

## 2021-01-15 DIAGNOSIS — M17.12 UNILATERAL PRIMARY OSTEOARTHRITIS, LEFT KNEE: ICD-10-CM

## 2021-01-15 DIAGNOSIS — Z86.39 PERSONAL HISTORY OF OTHER ENDOCRINE, NUTRITIONAL AND METABOLIC DISEASE: ICD-10-CM

## 2021-01-15 DIAGNOSIS — Z86.69 PERSONAL HISTORY OF OTHER DISEASES OF THE NERVOUS SYSTEM AND SENSE ORGANS: ICD-10-CM

## 2021-01-15 DIAGNOSIS — Z80.9 FAMILY HISTORY OF MALIGNANT NEOPLASM, UNSPECIFIED: ICD-10-CM

## 2021-01-15 DIAGNOSIS — Z87.09 PERSONAL HISTORY OF OTHER DISEASES OF THE RESPIRATORY SYSTEM: ICD-10-CM

## 2021-01-15 DIAGNOSIS — Z87.39 PERSONAL HISTORY OF OTHER DISEASES OF THE MUSCULOSKELETAL SYSTEM AND CONNECTIVE TISSUE: ICD-10-CM

## 2021-01-15 DIAGNOSIS — M17.11 UNILATERAL PRIMARY OSTEOARTHRITIS, RIGHT KNEE: ICD-10-CM

## 2021-01-15 PROCEDURE — 99204 OFFICE O/P NEW MOD 45 MIN: CPT | Mod: 25

## 2021-01-15 PROCEDURE — 20610 DRAIN/INJ JOINT/BURSA W/O US: CPT | Mod: LT

## 2021-01-15 RX ORDER — PRAVASTATIN SODIUM 80 MG/1
TABLET ORAL
Refills: 0 | Status: ACTIVE | COMMUNITY

## 2021-01-15 RX ORDER — PRAZOSIN HYDROCHLORIDE 5 MG/1
CAPSULE ORAL
Refills: 0 | Status: ACTIVE | COMMUNITY

## 2021-01-15 RX ORDER — METHYLPHENIDATE HYDROCHLORIDE 18 MG/1
18 TABLET, EXTENDED RELEASE ORAL
Refills: 0 | Status: ACTIVE | COMMUNITY

## 2021-01-15 RX ORDER — VENLAFAXINE HYDROCHLORIDE 225 MG/1
225 TABLET, EXTENDED RELEASE ORAL
Refills: 0 | Status: ACTIVE | COMMUNITY

## 2021-01-15 RX ORDER — PREDNISONE 10 MG/1
10 TABLET ORAL
Refills: 0 | Status: ACTIVE | COMMUNITY

## 2021-01-15 RX ORDER — BUPROPION HYDROCHLORIDE 100 MG/1
TABLET, FILM COATED ORAL
Refills: 0 | Status: ACTIVE | COMMUNITY

## 2021-01-15 RX ORDER — ZOLPIDEM TARTRATE 10 MG/1
10 TABLET, FILM COATED ORAL
Refills: 0 | Status: ACTIVE | COMMUNITY

## 2021-01-15 RX ORDER — GABAPENTIN 600 MG/1
600 TABLET, COATED ORAL
Refills: 0 | Status: ACTIVE | COMMUNITY

## 2021-01-15 RX ORDER — TRIAMCINOLONE ACETONIDE 40 MG/ML
40 SUSPENSION INTRA-ARTERIAL; INTRAMUSCULAR
Qty: 1 | Refills: 0 | Status: COMPLETED | OUTPATIENT
Start: 2021-01-15

## 2021-01-15 RX ORDER — TRAMADOL HYDROCHLORIDE AND ACETAMINOPHEN 37.5; 325 MG/1; MG/1
37.5-325 TABLET, FILM COATED ORAL
Refills: 0 | Status: ACTIVE | COMMUNITY

## 2021-01-15 RX ORDER — OMEPRAZOLE 40 MG/1
CAPSULE, DELAYED RELEASE ORAL
Refills: 0 | Status: ACTIVE | COMMUNITY

## 2021-01-15 RX ORDER — BUDESONIDE AND FORMOTEROL FUMARATE DIHYDRATE 80; 4.5 UG/1; UG/1
80-4.5 AEROSOL RESPIRATORY (INHALATION)
Refills: 0 | Status: ACTIVE | COMMUNITY

## 2021-01-15 RX ADMIN — Medication 1 MG/ML: at 00:00

## 2021-01-15 NOTE — ASSESSMENT
[FreeTextEntry1] : 60 year old male presents for left knee pain. Acute onset of pain in the left knee, along with mechanical symptoms of occasional giving way. He has no prior Hx of problems with his knee. He comes in today to have his knee pain evaluated. His examination shows that his pain localizes to the medial compartment, along with a possible Madelyn sign and his radiographs shows significant medial compartment arthritis. My impression is he has medial compartment arthritis of the knee with possible degenerative meniscal tear. He was given an injection of Kenalog and Lidocaine in an attempt to relieve his acute pain. He is to follow up in 2-3 weeks. If by that time he is not better we will order an MRI to look for a meniscal tear. \par \par Discussed at length with the patient the planned steroid and lidocaine injection. The risks, benefits, convalescence and alternatives were reviewed. The possible side effects discussed included but were not limited to: pain, swelling, heat and redness. There symptoms are generally mild but if they are extensive then contact the office. Giving pain relievers by mouth such as NSAID’s or Tylenol can generally treat the reactions to steroid and lidocaine. Rare cases of infection have been noted. Rash, hives and itching may occur post injection. If you have muscle pain or cramps, flushing and or swelling of the face, rapid heart beat, nausea, dizziness, fever, chills, headache, difficulty breathing, swelling in the arms or legs, or have a prickly feeling of your skin, contact a health care provider immediately.\par \par Following this discussion, the left knee was prepped with Betadine and under sterile conditions 4 cc of 1% lidocaine and 1 ml Kenalog were injected with a 21 gauge needle. The needle was introduced into the joint, aspiration was performed to ensure intra-articular placement and the medication was injected. Upon withdrawal of the needle the site was cleaned with alcohol and a Band-Aid applied. The patient tolerated the injection well and there were no adverse effects. Post injection instructions included no strenuous activity for 24 hours, cryotherapy and if there are any adverse effects to contact the office.

## 2021-01-15 NOTE — HISTORY OF PRESENT ILLNESS
[de-identified] : 60 year old male presents to the office today complaining of left knee pain x1-2 weeks. Patient reports pain in the left knee that is sharp in nature and mostly at the medial aspect of his left knee. He reports swelling, buckling, and a loss of motion feeling that he lacks flexion. He reports pain immediately with ambulation. There is also pain and difficulty with negotiating stairs, worse with descending. There is pain with standing from a seated position. Patient reports taking Tramadol-Acetaminophen for pain, but states this is mostly for his Lupus and it has not helped his left knee pain. Patient has a history of chronic steroid use, taking Prednisone daily for his steroids. Patient is here today to discuss his options for pain relief.

## 2021-02-05 ENCOUNTER — APPOINTMENT (OUTPATIENT)
Dept: ORTHOPEDIC SURGERY | Facility: CLINIC | Age: 61
End: 2021-02-05

## 2021-02-05 NOTE — PHYSICAL EXAM
[de-identified] : 01/15/2021- \par General appearance: well nourished and hydrated, pleasant, alert and oriented x 3, cooperative.\par Cardiovascular: no apparent abnormalities, no lower leg edema, no varicosities, pedal pulses are palpable.\par Neurologic: sensation is normal, no muscle weakness in upper or lower extremities\par Dermatologic no apparent skin lesions, moist, warm, no rash.\par Gait: nonantalgic.\par \par Left knee\par Inspection: no effusion or erythema.\par Wounds: none.\par Alignment: varus deformity \par Palpation: medial joint line tenderness \par ROM: 0-90 degrees \par Positive Madelyn's test \par Ligamentous laxity: all ligaments appear stable\par Muscle Test: good quad strength.\par \par Right knee\par Inspection: no effusion or erythema.\par Wounds: none.\par Alignment: normal.\par Palpation: no specific tenderness on palpation.\par ROM: 0-115 degrees \par Ligamentous laxity: all ligaments appear stable\par Muscle Test: good quad strength.\par \par Left hip\par Inspection: No swelling or ecchymosis.\par Wounds: none.\par Palpation: non-tender.\par Stability: no instability.\par Strength: 5/5 all motor groups.\par ROM: no pain with FROM.\par Leg length: equal.\par \par Right hip\par Inspection: No swelling or ecchymosis.\par Wounds: none.\par Palpation: non-tender.\par Stability: no instability.\par Strength: 5/5 all motor groups.\par ROM: Flexion to 70 ,ER 30 , ABD 30 \par Leg length: equal.\par \par   [de-identified] : 01/15/2021- Radiographs done today AP lateral and skyline of both knees shows significantly narrowed but maintained medial joint space in the left knee, 50 percent joint space narrowing of the medial compartment of the right knee.

## 2021-02-05 NOTE — ASSESSMENT
[FreeTextEntry1] : Pt presents for follow up of his bilateral arthritic knees after receiving a cortisone injection on 1/15/2021

## 2021-02-05 NOTE — HISTORY OF PRESENT ILLNESS
[de-identified] : 60 year old male presents to the office for follow up  of his bilateral arthritic knees

## 2022-11-01 NOTE — BRIEF OPERATIVE NOTE - NSICDXBRIEFPOSTOP_GEN_ALL_CORE_FT
POST-OP DIAGNOSIS:  DDD (degenerative disc disease), lumbar 10-Mar-2020 10:59:34  Lexy Mcclendon
POST-OP DIAGNOSIS:  DDD (degenerative disc disease), lumbar 10-Mar-2020 10:59:34  Lexy Mcclendon
same name as above

## 2023-08-17 ENCOUNTER — OUTPATIENT (OUTPATIENT)
Dept: INPATIENT UNIT | Facility: HOSPITAL | Age: 63
LOS: 1 days | Discharge: HOME | End: 2023-08-17
Payer: MEDICARE

## 2023-08-17 VITALS
WEIGHT: 300.05 LBS | DIASTOLIC BLOOD PRESSURE: 79 MMHG | RESPIRATION RATE: 18 BRPM | SYSTOLIC BLOOD PRESSURE: 138 MMHG | HEART RATE: 91 BPM | OXYGEN SATURATION: 96 % | HEIGHT: 60 IN | TEMPERATURE: 97 F

## 2023-08-17 VITALS — DIASTOLIC BLOOD PRESSURE: 72 MMHG | RESPIRATION RATE: 17 BRPM | SYSTOLIC BLOOD PRESSURE: 145 MMHG | HEART RATE: 83 BPM

## 2023-08-17 DIAGNOSIS — Z41.9 ENCOUNTER FOR PROCEDURE FOR PURPOSES OTHER THAN REMEDYING HEALTH STATE, UNSPECIFIED: Chronic | ICD-10-CM

## 2023-08-17 DIAGNOSIS — Z98.890 OTHER SPECIFIED POSTPROCEDURAL STATES: Chronic | ICD-10-CM

## 2023-08-17 DIAGNOSIS — H25.11 AGE-RELATED NUCLEAR CATARACT, RIGHT EYE: ICD-10-CM

## 2023-08-17 PROCEDURE — V2632: CPT

## 2023-08-17 RX ORDER — GABAPENTIN 400 MG/1
1 CAPSULE ORAL
Qty: 0 | Refills: 0 | DISCHARGE

## 2023-08-17 RX ORDER — BUPROPION HYDROCHLORIDE 150 MG/1
2 TABLET, EXTENDED RELEASE ORAL
Qty: 0 | Refills: 0 | DISCHARGE

## 2023-08-17 RX ORDER — PRAZOSIN HCL 2 MG
3 CAPSULE ORAL
Qty: 0 | Refills: 0 | DISCHARGE

## 2023-08-17 RX ORDER — RITUXIMAB 10 MG/ML
0 INJECTION, SOLUTION INTRAVENOUS
Qty: 0 | Refills: 0 | DISCHARGE

## 2023-08-17 RX ORDER — OMEPRAZOLE 10 MG/1
1 CAPSULE, DELAYED RELEASE ORAL
Qty: 0 | Refills: 0 | DISCHARGE

## 2023-08-17 RX ORDER — VENLAFAXINE HCL 75 MG
225 CAPSULE, EXT RELEASE 24 HR ORAL
Qty: 0 | Refills: 0 | DISCHARGE

## 2023-08-17 RX ORDER — AMLODIPINE BESYLATE 2.5 MG/1
1 TABLET ORAL
Qty: 0 | Refills: 0 | DISCHARGE

## 2023-08-17 RX ORDER — METHYLPHENIDATE HCL 5 MG
1 TABLET ORAL
Qty: 0 | Refills: 0 | DISCHARGE

## 2023-08-17 RX ORDER — ESZOPICLONE 2 MG/1
1 TABLET, COATED ORAL
Qty: 0 | Refills: 0 | DISCHARGE

## 2023-08-17 RX ORDER — BUDESONIDE AND FORMOTEROL FUMARATE DIHYDRATE 160; 4.5 UG/1; UG/1
2 AEROSOL RESPIRATORY (INHALATION)
Qty: 0 | Refills: 0 | DISCHARGE

## 2023-08-17 NOTE — ASU PATIENT PROFILE, ADULT - INTERNATIONAL TRAVEL
Take skelaxin that you have today and as needed. You may begin taking celebrex you have tomorrow. Do not take celebrex today  
No

## 2023-08-17 NOTE — ASU PATIENT PROFILE, ADULT - NSICDXPASTSURGICALHX_GEN_ALL_CORE_FT
PAST SURGICAL HISTORY:  S/P hernia repair inguinal    Surgery, elective spinal fusion, T9-L5    Surgery, elective cervical spine fusion    Surgery, elective sppinal cord stimulator implanted    Surgery, elective ankle surgery, right

## 2023-08-17 NOTE — ASU DISCHARGE PLAN (ADULT/PEDIATRIC) - NS MD DC FALL RISK RISK
For information on Fall & Injury Prevention, visit: [email or web address] OR  [email or web address] OR  https://www.cdc.gov/steadi/patient.html

## 2023-08-21 DIAGNOSIS — E78.5 HYPERLIPIDEMIA, UNSPECIFIED: ICD-10-CM

## 2023-08-21 DIAGNOSIS — M54.50 LOW BACK PAIN, UNSPECIFIED: ICD-10-CM

## 2023-08-21 DIAGNOSIS — I10 ESSENTIAL (PRIMARY) HYPERTENSION: ICD-10-CM

## 2023-08-21 DIAGNOSIS — J44.9 CHRONIC OBSTRUCTIVE PULMONARY DISEASE, UNSPECIFIED: ICD-10-CM

## 2023-08-21 DIAGNOSIS — M32.9 SYSTEMIC LUPUS ERYTHEMATOSUS, UNSPECIFIED: ICD-10-CM

## 2023-08-21 DIAGNOSIS — Z88.8 ALLERGY STATUS TO OTHER DRUGS, MEDICAMENTS AND BIOLOGICAL SUBSTANCES: ICD-10-CM

## 2023-08-21 DIAGNOSIS — G89.29 OTHER CHRONIC PAIN: ICD-10-CM

## 2023-08-21 DIAGNOSIS — K21.9 GASTRO-ESOPHAGEAL REFLUX DISEASE WITHOUT ESOPHAGITIS: ICD-10-CM

## 2023-08-21 DIAGNOSIS — M48.00 SPINAL STENOSIS, SITE UNSPECIFIED: ICD-10-CM

## 2023-08-21 DIAGNOSIS — F32.9 MAJOR DEPRESSIVE DISORDER, SINGLE EPISODE, UNSPECIFIED: ICD-10-CM

## 2023-08-21 DIAGNOSIS — H25.89 OTHER AGE-RELATED CATARACT: ICD-10-CM

## 2023-08-21 DIAGNOSIS — Z79.899 OTHER LONG TERM (CURRENT) DRUG THERAPY: ICD-10-CM

## 2023-09-26 NOTE — PROGRESS NOTE ADULT - SUBJECTIVE AND OBJECTIVE BOX
Orthopaedic Surgery Progress Note    Post-operative day #8 s/p L5-S1 ALIF/PSF    Subjective:     Patient seen and examined. States he is feeling better today. States his right lower extremity is improved from yesterday. He states that he would like to go home today instead of going to rehab.   Denies chest pain, shortness of breath    Objective:    Vital Signs Last 24 Hrs  T(C): 36.7 (03-18-20 @ 08:32), Max: 36.7 (03-18-20 @ 08:32)  T(F): 98 (03-18-20 @ 08:32), Max: 98 (03-18-20 @ 08:32)  HR: 67 (03-18-20 @ 08:32) (67 - 79)  BP: 139/50 (03-18-20 @ 08:32) (139/50 - 144/72)  BP(mean): --  RR: 12 (03-18-20 @ 08:32) (12 - 16)  SpO2: 95% (03-18-20 @ 08:32) (95% - 95%)  AVSS    PE:  General: Patient alert and oriented, NAD  Dressing check deferred due to patient lying on back   Sensation: intact to bilateral lower extremities   Motor: EHL/FHL/TA/GS firing bilateral lower extremities                           11.9   14.19 )-----------( 310      ( 17 Mar 2020 07:20 )             36.9   17 Mar 2020 07:20    140    |  102    |  12     ----------------------------<  139    4.3     |  27     |  0.63           A/P: 59yMale POD#8 s/p above procedure   1. Pain control as needed  2. DVT prophylaxis: SCDs  3. PT, weight-bearing status: patient put on bed rest yesterday; per. Dr. Reese will keep patient on bedrest this AM and allow to ambulate this afternoon   4. per Dr. Ratliff, continue with decadron 4 mg q 12 today  5. Dispo: pending. If patient feeling better later, may discharge home on medrol dose pack vs. going to rehab.     Ortho Pager [phone] balance training/bed mobility training/gait training/strengthening/transfer training

## 2023-11-21 NOTE — PHYSICAL THERAPY INITIAL EVALUATION ADULT - DIAGNOSIS, PT EVAL
no chest pain, no cough, and no shortness of breath.
Impaired Joint Mobility, Motor Function, Muscle Performance, and Range of Motion Associated and Reflex Integrity Associated with Spinal Disorders.